# Patient Record
Sex: FEMALE | Race: WHITE | NOT HISPANIC OR LATINO | Employment: UNEMPLOYED | URBAN - METROPOLITAN AREA
[De-identification: names, ages, dates, MRNs, and addresses within clinical notes are randomized per-mention and may not be internally consistent; named-entity substitution may affect disease eponyms.]

---

## 2017-01-10 ENCOUNTER — RECORDS - HEALTHEAST (OUTPATIENT)
Dept: GENERAL RADIOLOGY | Facility: CLINIC | Age: 57
End: 2017-01-10

## 2017-01-10 ENCOUNTER — OFFICE VISIT - HEALTHEAST (OUTPATIENT)
Dept: FAMILY MEDICINE | Facility: CLINIC | Age: 57
End: 2017-01-10

## 2017-01-10 ENCOUNTER — COMMUNICATION - HEALTHEAST (OUTPATIENT)
Dept: TELEHEALTH | Facility: CLINIC | Age: 57
End: 2017-01-10

## 2017-01-10 DIAGNOSIS — R22.41 LOCALIZED SWELLING, MASS AND LUMP, RIGHT LOWER LIMB: ICD-10-CM

## 2017-01-10 DIAGNOSIS — F41.1 GENERALIZED ANXIETY DISORDER: ICD-10-CM

## 2017-01-10 DIAGNOSIS — Z51.81 MEDICATION MONITORING ENCOUNTER: ICD-10-CM

## 2017-01-10 DIAGNOSIS — Z23 NEED FOR INFLUENZA VACCINATION: ICD-10-CM

## 2017-01-10 DIAGNOSIS — Z11.59 NEED FOR HEPATITIS C SCREENING TEST: ICD-10-CM

## 2017-01-10 DIAGNOSIS — L98.9 FACIAL SKIN LESION: ICD-10-CM

## 2017-01-10 DIAGNOSIS — G43.909 MIGRAINE: ICD-10-CM

## 2017-01-10 DIAGNOSIS — D12.6 ADENOMATOUS COLON POLYP: ICD-10-CM

## 2017-01-10 DIAGNOSIS — R22.41 MASS OF HIP REGION, RIGHT: ICD-10-CM

## 2017-01-11 ENCOUNTER — COMMUNICATION - HEALTHEAST (OUTPATIENT)
Dept: FAMILY MEDICINE | Facility: CLINIC | Age: 57
End: 2017-01-11

## 2017-01-11 LAB — HCV AB SERPL QL IA: NEGATIVE

## 2017-02-23 ENCOUNTER — RECORDS - HEALTHEAST (OUTPATIENT)
Dept: ADMINISTRATIVE | Facility: OTHER | Age: 57
End: 2017-02-23

## 2017-02-24 ENCOUNTER — COMMUNICATION - HEALTHEAST (OUTPATIENT)
Dept: FAMILY MEDICINE | Facility: CLINIC | Age: 57
End: 2017-02-24

## 2017-05-06 ENCOUNTER — RECORDS - HEALTHEAST (OUTPATIENT)
Dept: ADMINISTRATIVE | Facility: OTHER | Age: 57
End: 2017-05-06

## 2017-05-06 ENCOUNTER — COMMUNICATION - HEALTHEAST (OUTPATIENT)
Dept: FAMILY MEDICINE | Facility: CLINIC | Age: 57
End: 2017-05-06

## 2017-05-09 ENCOUNTER — RECORDS - HEALTHEAST (OUTPATIENT)
Dept: ADMINISTRATIVE | Facility: OTHER | Age: 57
End: 2017-05-09

## 2017-05-15 ENCOUNTER — RECORDS - HEALTHEAST (OUTPATIENT)
Dept: ADMINISTRATIVE | Facility: OTHER | Age: 57
End: 2017-05-15

## 2017-05-16 ENCOUNTER — HOSPITAL ENCOUNTER (OUTPATIENT)
Dept: MAMMOGRAPHY | Facility: CLINIC | Age: 57
Discharge: HOME | End: 2017-05-16
Attending: FAMILY MEDICINE

## 2017-05-16 ENCOUNTER — COMMUNICATION - HEALTHEAST (OUTPATIENT)
Dept: TELEHEALTH | Facility: CLINIC | Age: 57
End: 2017-05-16

## 2017-05-16 DIAGNOSIS — Z12.31 VISIT FOR SCREENING MAMMOGRAM: ICD-10-CM

## 2017-07-10 ENCOUNTER — COMMUNICATION - HEALTHEAST (OUTPATIENT)
Dept: FAMILY MEDICINE | Facility: CLINIC | Age: 57
End: 2017-07-10

## 2017-07-20 ENCOUNTER — COMMUNICATION - HEALTHEAST (OUTPATIENT)
Dept: FAMILY MEDICINE | Facility: CLINIC | Age: 57
End: 2017-07-20

## 2017-08-22 ENCOUNTER — COMMUNICATION - HEALTHEAST (OUTPATIENT)
Dept: FAMILY MEDICINE | Facility: CLINIC | Age: 57
End: 2017-08-22

## 2018-01-20 ENCOUNTER — COMMUNICATION - HEALTHEAST (OUTPATIENT)
Dept: FAMILY MEDICINE | Facility: CLINIC | Age: 58
End: 2018-01-20

## 2018-01-20 DIAGNOSIS — F41.1 GENERALIZED ANXIETY DISORDER: ICD-10-CM

## 2018-02-15 ENCOUNTER — OFFICE VISIT - HEALTHEAST (OUTPATIENT)
Dept: FAMILY MEDICINE | Facility: CLINIC | Age: 58
End: 2018-02-15

## 2018-02-15 ENCOUNTER — COMMUNICATION - HEALTHEAST (OUTPATIENT)
Dept: TELEHEALTH | Facility: CLINIC | Age: 58
End: 2018-02-15

## 2018-02-15 DIAGNOSIS — Z12.4 SCREENING FOR MALIGNANT NEOPLASM OF CERVIX: ICD-10-CM

## 2018-02-15 DIAGNOSIS — Z51.81 MEDICATION MONITORING ENCOUNTER: ICD-10-CM

## 2018-02-15 DIAGNOSIS — E78.2 MIXED HYPERLIPIDEMIA: ICD-10-CM

## 2018-02-15 DIAGNOSIS — F41.1 ANXIETY STATE: ICD-10-CM

## 2018-02-15 DIAGNOSIS — Z79.899 CONTROLLED SUBSTANCE AGREEMENT SIGNED: ICD-10-CM

## 2018-02-15 DIAGNOSIS — R53.83 FATIGUE: ICD-10-CM

## 2018-02-15 DIAGNOSIS — F41.1 GENERALIZED ANXIETY DISORDER: ICD-10-CM

## 2018-02-15 DIAGNOSIS — Z00.00 ROUTINE ADULT HEALTH MAINTENANCE: ICD-10-CM

## 2018-02-15 LAB
ALBUMIN SERPL-MCNC: 4.4 G/DL (ref 3.5–5)
ALP SERPL-CCNC: 116 U/L (ref 45–120)
ALT SERPL W P-5'-P-CCNC: 18 U/L (ref 0–45)
ANION GAP SERPL CALCULATED.3IONS-SCNC: 11 MMOL/L (ref 5–18)
AST SERPL W P-5'-P-CCNC: 18 U/L (ref 0–40)
BILIRUB SERPL-MCNC: 0.7 MG/DL (ref 0–1)
BUN SERPL-MCNC: 14 MG/DL (ref 8–22)
CALCIUM SERPL-MCNC: 9.8 MG/DL (ref 8.5–10.5)
CHLORIDE BLD-SCNC: 105 MMOL/L (ref 98–107)
CHOLEST SERPL-MCNC: 327 MG/DL
CO2 SERPL-SCNC: 25 MMOL/L (ref 22–31)
CREAT SERPL-MCNC: 0.78 MG/DL (ref 0.6–1.1)
ERYTHROCYTE [DISTWIDTH] IN BLOOD BY AUTOMATED COUNT: 11.8 % (ref 11–14.5)
FASTING STATUS PATIENT QL REPORTED: YES
GFR SERPL CREATININE-BSD FRML MDRD: >60 ML/MIN/1.73M2
GLUCOSE BLD-MCNC: 93 MG/DL (ref 70–125)
HCT VFR BLD AUTO: 42.8 % (ref 35–47)
HDLC SERPL-MCNC: 53 MG/DL
HGB BLD-MCNC: 14.8 G/DL (ref 12–16)
LDLC SERPL CALC-MCNC: 248 MG/DL
MCH RBC QN AUTO: 31.1 PG (ref 27–34)
MCHC RBC AUTO-ENTMCNC: 34.5 G/DL (ref 32–36)
MCV RBC AUTO: 90 FL (ref 80–100)
PLATELET # BLD AUTO: 217 THOU/UL (ref 140–440)
PMV BLD AUTO: 7.3 FL (ref 7–10)
POTASSIUM BLD-SCNC: 4 MMOL/L (ref 3.5–5)
PROT SERPL-MCNC: 7.7 G/DL (ref 6–8)
RBC # BLD AUTO: 4.75 MILL/UL (ref 3.8–5.4)
SODIUM SERPL-SCNC: 141 MMOL/L (ref 136–145)
TRIGL SERPL-MCNC: 128 MG/DL
TSH SERPL DL<=0.005 MIU/L-ACNC: 1.46 UIU/ML (ref 0.3–5)
WBC: 5.1 THOU/UL (ref 4–11)

## 2018-02-15 ASSESSMENT — MIFFLIN-ST. JEOR: SCORE: 1249.91

## 2018-02-16 ENCOUNTER — COMMUNICATION - HEALTHEAST (OUTPATIENT)
Dept: FAMILY MEDICINE | Facility: CLINIC | Age: 58
End: 2018-02-16

## 2018-02-16 LAB
HPV SOURCE: NORMAL
HUMAN PAPILLOMA VIRUS 16 DNA: NEGATIVE
HUMAN PAPILLOMA VIRUS 18 DNA: NEGATIVE
HUMAN PAPILLOMA VIRUS FINAL DIAGNOSIS: NORMAL
HUMAN PAPILLOMA VIRUS OTHER HR: NEGATIVE
SPECIMEN DESCRIPTION: NORMAL

## 2018-02-20 ENCOUNTER — COMMUNICATION - HEALTHEAST (OUTPATIENT)
Dept: FAMILY MEDICINE | Facility: CLINIC | Age: 58
End: 2018-02-20

## 2018-02-22 LAB
BKR LAB AP ABNORMAL BLEEDING: NO
BKR LAB AP BIRTH CONTROL/HORMONES: ABNORMAL
BKR LAB AP CERVICAL APPEARANCE: NORMAL
BKR LAB AP GYN ADEQUACY: ABNORMAL
BKR LAB AP GYN INTERPRETATION: ABNORMAL
BKR LAB AP GYN OTHER FINDINGS: ABNORMAL
BKR LAB AP HPV REFLEX: ABNORMAL
BKR LAB AP LMP: 2013
BKR LAB AP PATIENT STATUS: ABNORMAL
BKR LAB AP PREVIOUS ABNORMAL: ABNORMAL
BKR LAB AP PREVIOUS NORMAL: 2012
HIGH RISK?: NO
PATH REPORT.COMMENTS IMP SPEC: ABNORMAL
RESULT FLAG (HE HISTORICAL CONVERSION): ABNORMAL

## 2018-03-09 ENCOUNTER — COMMUNICATION - HEALTHEAST (OUTPATIENT)
Dept: FAMILY MEDICINE | Facility: CLINIC | Age: 58
End: 2018-03-09

## 2018-03-09 DIAGNOSIS — E78.2 MIXED HYPERLIPIDEMIA: ICD-10-CM

## 2018-05-21 ENCOUNTER — HOSPITAL ENCOUNTER (OUTPATIENT)
Dept: NUTRITION | Facility: HOSPITAL | Age: 58
Discharge: HOME | End: 2018-05-21
Attending: FAMILY MEDICINE

## 2018-05-21 DIAGNOSIS — E78.2 MIXED HYPERLIPIDEMIA: ICD-10-CM

## 2018-08-22 ENCOUNTER — COMMUNICATION - HEALTHEAST (OUTPATIENT)
Dept: FAMILY MEDICINE | Facility: CLINIC | Age: 58
End: 2018-08-22

## 2018-09-10 ENCOUNTER — COMMUNICATION - HEALTHEAST (OUTPATIENT)
Dept: SCHEDULING | Facility: CLINIC | Age: 58
End: 2018-09-10

## 2018-09-14 ENCOUNTER — OFFICE VISIT - HEALTHEAST (OUTPATIENT)
Dept: FAMILY MEDICINE | Facility: CLINIC | Age: 58
End: 2018-09-14

## 2018-09-14 ENCOUNTER — COMMUNICATION - HEALTHEAST (OUTPATIENT)
Dept: TELEHEALTH | Facility: CLINIC | Age: 58
End: 2018-09-14

## 2018-09-14 DIAGNOSIS — B37.31 YEAST INFECTION OF THE VAGINA: ICD-10-CM

## 2018-09-14 DIAGNOSIS — J32.9 SINUS INFECTION: ICD-10-CM

## 2018-09-14 DIAGNOSIS — Z23 NEEDS FLU SHOT: ICD-10-CM

## 2018-09-20 ENCOUNTER — COMMUNICATION - HEALTHEAST (OUTPATIENT)
Dept: FAMILY MEDICINE | Facility: CLINIC | Age: 58
End: 2018-09-20

## 2018-10-04 ENCOUNTER — OFFICE VISIT - HEALTHEAST (OUTPATIENT)
Dept: FAMILY MEDICINE | Facility: CLINIC | Age: 58
End: 2018-10-04

## 2018-10-04 DIAGNOSIS — G43.909 MIGRAINE: ICD-10-CM

## 2018-10-04 DIAGNOSIS — C72.0: ICD-10-CM

## 2018-10-04 DIAGNOSIS — K58.2 IRRITABLE BOWEL SYNDROME WITH BOTH CONSTIPATION AND DIARRHEA: ICD-10-CM

## 2018-10-04 DIAGNOSIS — E78.2 MIXED HYPERLIPIDEMIA: ICD-10-CM

## 2018-10-04 DIAGNOSIS — F41.1 ANXIETY STATE: ICD-10-CM

## 2018-10-05 ENCOUNTER — HOSPITAL ENCOUNTER (OUTPATIENT)
Dept: MAMMOGRAPHY | Facility: CLINIC | Age: 58
Discharge: HOME | End: 2018-10-05
Attending: FAMILY MEDICINE

## 2018-10-05 DIAGNOSIS — Z12.31 VISIT FOR SCREENING MAMMOGRAM: ICD-10-CM

## 2018-10-05 LAB
ALBUMIN SERPL-MCNC: 4.5 G/DL (ref 3.5–5)
ALP SERPL-CCNC: 129 U/L (ref 45–120)
ALT SERPL W P-5'-P-CCNC: 21 U/L (ref 0–45)
ANION GAP SERPL CALCULATED.3IONS-SCNC: 12 MMOL/L (ref 5–18)
AST SERPL W P-5'-P-CCNC: 19 U/L (ref 0–40)
BILIRUB SERPL-MCNC: 0.9 MG/DL (ref 0–1)
BUN SERPL-MCNC: 10 MG/DL (ref 8–22)
CALCIUM SERPL-MCNC: 10 MG/DL (ref 8.5–10.5)
CHLORIDE BLD-SCNC: 108 MMOL/L (ref 98–107)
CHOLEST SERPL-MCNC: 192 MG/DL
CO2 SERPL-SCNC: 23 MMOL/L (ref 22–31)
CREAT SERPL-MCNC: 0.79 MG/DL (ref 0.6–1.1)
FASTING STATUS PATIENT QL REPORTED: YES
GFR SERPL CREATININE-BSD FRML MDRD: >60 ML/MIN/1.73M2
GLUCOSE BLD-MCNC: 93 MG/DL (ref 70–125)
HDLC SERPL-MCNC: 58 MG/DL
LDLC SERPL CALC-MCNC: 117 MG/DL
POTASSIUM BLD-SCNC: 3.8 MMOL/L (ref 3.5–5)
PROT SERPL-MCNC: 7.2 G/DL (ref 6–8)
SODIUM SERPL-SCNC: 143 MMOL/L (ref 136–145)
TRIGL SERPL-MCNC: 83 MG/DL

## 2018-10-09 ENCOUNTER — COMMUNICATION - HEALTHEAST (OUTPATIENT)
Dept: FAMILY MEDICINE | Facility: CLINIC | Age: 58
End: 2018-10-09

## 2018-10-22 ENCOUNTER — COMMUNICATION - HEALTHEAST (OUTPATIENT)
Dept: FAMILY MEDICINE | Facility: CLINIC | Age: 58
End: 2018-10-22

## 2018-10-22 DIAGNOSIS — E78.2 MIXED HYPERLIPIDEMIA: ICD-10-CM

## 2018-10-31 ENCOUNTER — RECORDS - HEALTHEAST (OUTPATIENT)
Dept: ADMINISTRATIVE | Facility: OTHER | Age: 58
End: 2018-10-31

## 2019-02-22 ENCOUNTER — COMMUNICATION - HEALTHEAST (OUTPATIENT)
Dept: FAMILY MEDICINE | Facility: CLINIC | Age: 59
End: 2019-02-22

## 2019-02-22 DIAGNOSIS — G43.909 MIGRAINE: ICD-10-CM

## 2019-04-25 ENCOUNTER — COMMUNICATION - HEALTHEAST (OUTPATIENT)
Dept: FAMILY MEDICINE | Facility: CLINIC | Age: 59
End: 2019-04-25

## 2019-04-25 DIAGNOSIS — F41.1 GENERALIZED ANXIETY DISORDER: ICD-10-CM

## 2019-04-26 ENCOUNTER — COMMUNICATION - HEALTHEAST (OUTPATIENT)
Dept: FAMILY MEDICINE | Facility: CLINIC | Age: 59
End: 2019-04-26

## 2019-04-26 DIAGNOSIS — F41.1 ANXIETY STATE: ICD-10-CM

## 2019-05-09 ENCOUNTER — OFFICE VISIT - HEALTHEAST (OUTPATIENT)
Dept: FAMILY MEDICINE | Facility: CLINIC | Age: 59
End: 2019-05-09

## 2019-05-09 DIAGNOSIS — G43.009 MIGRAINE WITHOUT AURA AND WITHOUT STATUS MIGRAINOSUS, NOT INTRACTABLE: ICD-10-CM

## 2019-05-09 DIAGNOSIS — C72.0: ICD-10-CM

## 2019-05-09 DIAGNOSIS — M54.9 CHRONIC BILATERAL BACK PAIN, UNSPECIFIED BACK LOCATION: ICD-10-CM

## 2019-05-09 DIAGNOSIS — F41.1 ANXIETY STATE: ICD-10-CM

## 2019-05-09 DIAGNOSIS — G89.29 CHRONIC BILATERAL BACK PAIN, UNSPECIFIED BACK LOCATION: ICD-10-CM

## 2019-05-29 ENCOUNTER — COMMUNICATION - HEALTHEAST (OUTPATIENT)
Dept: FAMILY MEDICINE | Facility: CLINIC | Age: 59
End: 2019-05-29

## 2019-05-31 ENCOUNTER — OFFICE VISIT - HEALTHEAST (OUTPATIENT)
Dept: FAMILY MEDICINE | Facility: CLINIC | Age: 59
End: 2019-05-31

## 2019-05-31 DIAGNOSIS — F33.2 SEVERE EPISODE OF RECURRENT MAJOR DEPRESSIVE DISORDER, WITHOUT PSYCHOTIC FEATURES (H): ICD-10-CM

## 2019-05-31 DIAGNOSIS — C72.0: ICD-10-CM

## 2019-05-31 DIAGNOSIS — F41.1 ANXIETY STATE: ICD-10-CM

## 2019-05-31 DIAGNOSIS — G43.909 MIGRAINE WITHOUT STATUS MIGRAINOSUS, NOT INTRACTABLE, UNSPECIFIED MIGRAINE TYPE: ICD-10-CM

## 2019-06-17 ENCOUNTER — COMMUNICATION - HEALTHEAST (OUTPATIENT)
Dept: FAMILY MEDICINE | Facility: CLINIC | Age: 59
End: 2019-06-17

## 2019-06-17 DIAGNOSIS — F41.1 ANXIETY STATE: ICD-10-CM

## 2019-06-21 ENCOUNTER — COMMUNICATION - HEALTHEAST (OUTPATIENT)
Dept: FAMILY MEDICINE | Facility: CLINIC | Age: 59
End: 2019-06-21

## 2019-06-21 DIAGNOSIS — G43.009 MIGRAINE WITHOUT AURA AND WITHOUT STATUS MIGRAINOSUS, NOT INTRACTABLE: ICD-10-CM

## 2019-10-19 ENCOUNTER — COMMUNICATION - HEALTHEAST (OUTPATIENT)
Dept: FAMILY MEDICINE | Facility: CLINIC | Age: 59
End: 2019-10-19

## 2019-10-19 DIAGNOSIS — E78.2 MIXED HYPERLIPIDEMIA: ICD-10-CM

## 2019-10-19 DIAGNOSIS — F41.1 GENERALIZED ANXIETY DISORDER: ICD-10-CM

## 2019-10-24 ENCOUNTER — OFFICE VISIT - HEALTHEAST (OUTPATIENT)
Dept: FAMILY MEDICINE | Facility: CLINIC | Age: 59
End: 2019-10-24

## 2019-10-24 DIAGNOSIS — M25.551 HIP PAIN, RIGHT: ICD-10-CM

## 2019-10-24 DIAGNOSIS — M54.9 CHRONIC BILATERAL BACK PAIN, UNSPECIFIED BACK LOCATION: ICD-10-CM

## 2019-10-24 DIAGNOSIS — F41.1 ANXIETY STATE: ICD-10-CM

## 2019-10-24 DIAGNOSIS — E78.2 MIXED HYPERLIPIDEMIA: ICD-10-CM

## 2019-10-24 DIAGNOSIS — G89.29 CHRONIC BILATERAL BACK PAIN, UNSPECIFIED BACK LOCATION: ICD-10-CM

## 2019-10-24 LAB
ALBUMIN SERPL-MCNC: 4.6 G/DL (ref 3.5–5)
ALP SERPL-CCNC: 115 U/L (ref 45–120)
ALT SERPL W P-5'-P-CCNC: 15 U/L (ref 0–45)
ANION GAP SERPL CALCULATED.3IONS-SCNC: 9 MMOL/L (ref 5–18)
AST SERPL W P-5'-P-CCNC: 17 U/L (ref 0–40)
BILIRUB SERPL-MCNC: 0.6 MG/DL (ref 0–1)
BUN SERPL-MCNC: 10 MG/DL (ref 8–22)
CALCIUM SERPL-MCNC: 9.8 MG/DL (ref 8.5–10.5)
CHLORIDE BLD-SCNC: 106 MMOL/L (ref 98–107)
CHOLEST SERPL-MCNC: 214 MG/DL
CO2 SERPL-SCNC: 28 MMOL/L (ref 22–31)
CREAT SERPL-MCNC: 0.73 MG/DL (ref 0.6–1.1)
FASTING STATUS PATIENT QL REPORTED: YES
GFR SERPL CREATININE-BSD FRML MDRD: >60 ML/MIN/1.73M2
GLUCOSE BLD-MCNC: 84 MG/DL (ref 70–125)
HDLC SERPL-MCNC: 62 MG/DL
LDLC SERPL CALC-MCNC: 134 MG/DL
POTASSIUM BLD-SCNC: 4 MMOL/L (ref 3.5–5)
PROT SERPL-MCNC: 7.1 G/DL (ref 6–8)
SODIUM SERPL-SCNC: 143 MMOL/L (ref 136–145)
TRIGL SERPL-MCNC: 90 MG/DL

## 2019-10-24 ASSESSMENT — ANXIETY QUESTIONNAIRES
3. WORRYING TOO MUCH ABOUT DIFFERENT THINGS: SEVERAL DAYS
1. FEELING NERVOUS, ANXIOUS, OR ON EDGE: SEVERAL DAYS
GAD7 TOTAL SCORE: 5
6. BECOMING EASILY ANNOYED OR IRRITABLE: MORE THAN HALF THE DAYS
4. TROUBLE RELAXING: SEVERAL DAYS
5. BEING SO RESTLESS THAT IT IS HARD TO SIT STILL: NOT AT ALL
7. FEELING AFRAID AS IF SOMETHING AWFUL MIGHT HAPPEN: NOT AT ALL
IF YOU CHECKED OFF ANY PROBLEMS ON THIS QUESTIONNAIRE, HOW DIFFICULT HAVE THESE PROBLEMS MADE IT FOR YOU TO DO YOUR WORK, TAKE CARE OF THINGS AT HOME, OR GET ALONG WITH OTHER PEOPLE: SOMEWHAT DIFFICULT
2. NOT BEING ABLE TO STOP OR CONTROL WORRYING: NOT AT ALL

## 2019-10-24 ASSESSMENT — PATIENT HEALTH QUESTIONNAIRE - PHQ9: SUM OF ALL RESPONSES TO PHQ QUESTIONS 1-9: 11

## 2019-10-24 ASSESSMENT — MIFFLIN-ST. JEOR: SCORE: 1195.71

## 2019-10-29 ENCOUNTER — COMMUNICATION - HEALTHEAST (OUTPATIENT)
Dept: FAMILY MEDICINE | Facility: CLINIC | Age: 59
End: 2019-10-29

## 2019-10-31 ENCOUNTER — RECORDS - HEALTHEAST (OUTPATIENT)
Dept: ADMINISTRATIVE | Facility: OTHER | Age: 59
End: 2019-10-31

## 2019-11-18 ENCOUNTER — COMMUNICATION - HEALTHEAST (OUTPATIENT)
Dept: FAMILY MEDICINE | Facility: CLINIC | Age: 59
End: 2019-11-18

## 2019-11-18 DIAGNOSIS — F41.1 ANXIETY STATE: ICD-10-CM

## 2019-11-18 DIAGNOSIS — M54.9 CHRONIC BILATERAL BACK PAIN, UNSPECIFIED BACK LOCATION: ICD-10-CM

## 2019-11-18 DIAGNOSIS — E78.2 MIXED HYPERLIPIDEMIA: ICD-10-CM

## 2019-11-18 DIAGNOSIS — G89.29 CHRONIC BILATERAL BACK PAIN, UNSPECIFIED BACK LOCATION: ICD-10-CM

## 2019-11-19 ENCOUNTER — COMMUNICATION - HEALTHEAST (OUTPATIENT)
Dept: FAMILY MEDICINE | Facility: CLINIC | Age: 59
End: 2019-11-19

## 2019-11-19 DIAGNOSIS — G43.909 MIGRAINE: ICD-10-CM

## 2019-11-26 ENCOUNTER — RECORDS - HEALTHEAST (OUTPATIENT)
Dept: ADMINISTRATIVE | Facility: OTHER | Age: 59
End: 2019-11-26

## 2019-12-19 ENCOUNTER — OFFICE VISIT - HEALTHEAST (OUTPATIENT)
Dept: FAMILY MEDICINE | Facility: CLINIC | Age: 59
End: 2019-12-19

## 2019-12-19 DIAGNOSIS — M88.9 PAGET'S BONE DISEASE: ICD-10-CM

## 2019-12-19 DIAGNOSIS — G43.909 MIGRAINE WITHOUT STATUS MIGRAINOSUS, NOT INTRACTABLE, UNSPECIFIED MIGRAINE TYPE: ICD-10-CM

## 2019-12-19 DIAGNOSIS — F41.1 ANXIETY STATE: ICD-10-CM

## 2019-12-19 DIAGNOSIS — E78.2 MIXED HYPERLIPIDEMIA: ICD-10-CM

## 2019-12-19 ASSESSMENT — MIFFLIN-ST. JEOR: SCORE: 1177.28

## 2019-12-21 LAB
ALP BONE SERPL-MCNC: 27.8 UG/L
COLLAGEN CTX SERPL-MCNC: 793 PG/ML
COLLAGEN NTX/CREAT UR-SRTO: 131
CREAT UR-MCNC: 121 MG/DL

## 2019-12-22 ENCOUNTER — COMMUNICATION - HEALTHEAST (OUTPATIENT)
Dept: FAMILY MEDICINE | Facility: CLINIC | Age: 59
End: 2019-12-22

## 2020-02-19 ENCOUNTER — COMMUNICATION - HEALTHEAST (OUTPATIENT)
Dept: FAMILY MEDICINE | Facility: CLINIC | Age: 60
End: 2020-02-19

## 2020-02-19 DIAGNOSIS — F41.1 ANXIETY STATE: ICD-10-CM

## 2020-03-24 ENCOUNTER — OFFICE VISIT - HEALTHEAST (OUTPATIENT)
Dept: FAMILY MEDICINE | Facility: CLINIC | Age: 60
End: 2020-03-24

## 2020-03-24 ENCOUNTER — COMMUNICATION - HEALTHEAST (OUTPATIENT)
Dept: FAMILY MEDICINE | Facility: CLINIC | Age: 60
End: 2020-03-24

## 2020-03-24 DIAGNOSIS — J01.00 ACUTE NON-RECURRENT MAXILLARY SINUSITIS: ICD-10-CM

## 2020-03-24 DIAGNOSIS — F41.1 ANXIETY STATE: ICD-10-CM

## 2020-03-24 DIAGNOSIS — M88.9 PAGET DISEASE OF BONE: ICD-10-CM

## 2020-03-24 DIAGNOSIS — C72.0: ICD-10-CM

## 2020-05-15 ENCOUNTER — RECORDS - HEALTHEAST (OUTPATIENT)
Dept: ADMINISTRATIVE | Facility: OTHER | Age: 60
End: 2020-05-15

## 2020-05-15 ENCOUNTER — HOSPITAL ENCOUNTER (OUTPATIENT)
Dept: MAMMOGRAPHY | Facility: CLINIC | Age: 60
Discharge: HOME | End: 2020-05-15
Attending: FAMILY MEDICINE

## 2020-05-15 DIAGNOSIS — Z12.31 VISIT FOR SCREENING MAMMOGRAM: ICD-10-CM

## 2020-05-20 ENCOUNTER — COMMUNICATION - HEALTHEAST (OUTPATIENT)
Dept: FAMILY MEDICINE | Facility: CLINIC | Age: 60
End: 2020-05-20

## 2020-06-12 ENCOUNTER — COMMUNICATION - HEALTHEAST (OUTPATIENT)
Dept: FAMILY MEDICINE | Facility: CLINIC | Age: 60
End: 2020-06-12

## 2020-06-12 DIAGNOSIS — F41.1 GENERALIZED ANXIETY DISORDER: ICD-10-CM

## 2020-06-17 ENCOUNTER — COMMUNICATION - HEALTHEAST (OUTPATIENT)
Dept: FAMILY MEDICINE | Facility: CLINIC | Age: 60
End: 2020-06-17

## 2020-06-17 DIAGNOSIS — F41.1 ANXIETY STATE: ICD-10-CM

## 2020-06-23 ENCOUNTER — COMMUNICATION - HEALTHEAST (OUTPATIENT)
Dept: FAMILY MEDICINE | Facility: CLINIC | Age: 60
End: 2020-06-23

## 2020-06-23 DIAGNOSIS — F41.1 ANXIETY STATE: ICD-10-CM

## 2020-08-26 ENCOUNTER — COMMUNICATION - HEALTHEAST (OUTPATIENT)
Dept: FAMILY MEDICINE | Facility: CLINIC | Age: 60
End: 2020-08-26

## 2020-08-26 DIAGNOSIS — F41.1 ANXIETY STATE: ICD-10-CM

## 2020-08-28 ENCOUNTER — OFFICE VISIT - HEALTHEAST (OUTPATIENT)
Dept: FAMILY MEDICINE | Facility: CLINIC | Age: 60
End: 2020-08-28

## 2020-08-28 DIAGNOSIS — F41.1 ANXIETY STATE: ICD-10-CM

## 2020-08-28 DIAGNOSIS — E78.2 MIXED HYPERLIPIDEMIA: ICD-10-CM

## 2020-08-28 DIAGNOSIS — G43.909 MIGRAINE WITHOUT STATUS MIGRAINOSUS, NOT INTRACTABLE, UNSPECIFIED MIGRAINE TYPE: ICD-10-CM

## 2020-08-28 DIAGNOSIS — Z23 NEED FOR VACCINATION: ICD-10-CM

## 2020-08-28 LAB
ALBUMIN SERPL-MCNC: 4.4 G/DL (ref 3.5–5)
ALP SERPL-CCNC: 66 U/L (ref 45–120)
ALT SERPL W P-5'-P-CCNC: 13 U/L (ref 0–45)
ANION GAP SERPL CALCULATED.3IONS-SCNC: 9 MMOL/L (ref 5–18)
AST SERPL W P-5'-P-CCNC: 15 U/L (ref 0–40)
BILIRUB SERPL-MCNC: 0.5 MG/DL (ref 0–1)
BUN SERPL-MCNC: 12 MG/DL (ref 8–22)
CALCIUM SERPL-MCNC: 9.6 MG/DL (ref 8.5–10.5)
CHLORIDE BLD-SCNC: 105 MMOL/L (ref 98–107)
CHOLEST SERPL-MCNC: 205 MG/DL
CO2 SERPL-SCNC: 29 MMOL/L (ref 22–31)
CREAT SERPL-MCNC: 0.75 MG/DL (ref 0.6–1.1)
FASTING STATUS PATIENT QL REPORTED: YES
GFR SERPL CREATININE-BSD FRML MDRD: >60 ML/MIN/1.73M2
GLUCOSE BLD-MCNC: 89 MG/DL (ref 70–125)
HDLC SERPL-MCNC: 60 MG/DL
LDLC SERPL CALC-MCNC: 126 MG/DL
POTASSIUM BLD-SCNC: 4 MMOL/L (ref 3.5–5)
PROT SERPL-MCNC: 7.2 G/DL (ref 6–8)
SODIUM SERPL-SCNC: 143 MMOL/L (ref 136–145)
TRIGL SERPL-MCNC: 94 MG/DL

## 2020-08-28 ASSESSMENT — PATIENT HEALTH QUESTIONNAIRE - PHQ9: SUM OF ALL RESPONSES TO PHQ QUESTIONS 1-9: 10

## 2020-08-28 ASSESSMENT — ANXIETY QUESTIONNAIRES
4. TROUBLE RELAXING: SEVERAL DAYS
3. WORRYING TOO MUCH ABOUT DIFFERENT THINGS: SEVERAL DAYS
IF YOU CHECKED OFF ANY PROBLEMS ON THIS QUESTIONNAIRE, HOW DIFFICULT HAVE THESE PROBLEMS MADE IT FOR YOU TO DO YOUR WORK, TAKE CARE OF THINGS AT HOME, OR GET ALONG WITH OTHER PEOPLE: SOMEWHAT DIFFICULT
2. NOT BEING ABLE TO STOP OR CONTROL WORRYING: SEVERAL DAYS
7. FEELING AFRAID AS IF SOMETHING AWFUL MIGHT HAPPEN: NOT AT ALL
6. BECOMING EASILY ANNOYED OR IRRITABLE: MORE THAN HALF THE DAYS
1. FEELING NERVOUS, ANXIOUS, OR ON EDGE: SEVERAL DAYS
GAD7 TOTAL SCORE: 7
5. BEING SO RESTLESS THAT IT IS HARD TO SIT STILL: SEVERAL DAYS

## 2020-08-28 ASSESSMENT — MIFFLIN-ST. JEOR: SCORE: 1244.47

## 2020-08-31 ENCOUNTER — COMMUNICATION - HEALTHEAST (OUTPATIENT)
Dept: FAMILY MEDICINE | Facility: CLINIC | Age: 60
End: 2020-08-31

## 2020-11-25 ENCOUNTER — COMMUNICATION - HEALTHEAST (OUTPATIENT)
Dept: FAMILY MEDICINE | Facility: CLINIC | Age: 60
End: 2020-11-25

## 2020-12-16 ENCOUNTER — COMMUNICATION - HEALTHEAST (OUTPATIENT)
Dept: FAMILY MEDICINE | Facility: CLINIC | Age: 60
End: 2020-12-16

## 2020-12-16 DIAGNOSIS — F41.1 GENERALIZED ANXIETY DISORDER: ICD-10-CM

## 2021-02-10 ENCOUNTER — COMMUNICATION - HEALTHEAST (OUTPATIENT)
Dept: FAMILY MEDICINE | Facility: CLINIC | Age: 61
End: 2021-02-10

## 2021-02-10 DIAGNOSIS — F41.1 ANXIETY STATE: ICD-10-CM

## 2021-02-23 ENCOUNTER — OFFICE VISIT - HEALTHEAST (OUTPATIENT)
Dept: FAMILY MEDICINE | Facility: CLINIC | Age: 61
End: 2021-02-23

## 2021-02-23 DIAGNOSIS — Z51.81 MEDICATION MONITORING ENCOUNTER: ICD-10-CM

## 2021-02-23 DIAGNOSIS — Z00.00 ROUTINE ADULT HEALTH MAINTENANCE: ICD-10-CM

## 2021-02-23 DIAGNOSIS — E78.2 MIXED HYPERLIPIDEMIA: ICD-10-CM

## 2021-02-23 DIAGNOSIS — F41.1 GENERALIZED ANXIETY DISORDER: ICD-10-CM

## 2021-02-23 DIAGNOSIS — G43.909 MIGRAINE WITHOUT STATUS MIGRAINOSUS, NOT INTRACTABLE, UNSPECIFIED MIGRAINE TYPE: ICD-10-CM

## 2021-02-23 DIAGNOSIS — Z12.4 SCREENING FOR MALIGNANT NEOPLASM OF CERVIX: ICD-10-CM

## 2021-02-23 DIAGNOSIS — F32.1 MODERATE MAJOR DEPRESSION (H): ICD-10-CM

## 2021-02-23 LAB
ALBUMIN SERPL-MCNC: 4.6 G/DL (ref 3.5–5)
ALP SERPL-CCNC: 66 U/L (ref 45–120)
ALT SERPL W P-5'-P-CCNC: 11 U/L (ref 0–45)
ANION GAP SERPL CALCULATED.3IONS-SCNC: 7 MMOL/L (ref 5–18)
AST SERPL W P-5'-P-CCNC: 15 U/L (ref 0–40)
BILIRUB SERPL-MCNC: 0.8 MG/DL (ref 0–1)
BUN SERPL-MCNC: 12 MG/DL (ref 8–22)
CALCIUM SERPL-MCNC: 9.2 MG/DL (ref 8.5–10.5)
CHLORIDE BLD-SCNC: 104 MMOL/L (ref 98–107)
CHOLEST SERPL-MCNC: 256 MG/DL
CO2 SERPL-SCNC: 30 MMOL/L (ref 22–31)
CREAT SERPL-MCNC: 0.76 MG/DL (ref 0.6–1.1)
ERYTHROCYTE [DISTWIDTH] IN BLOOD BY AUTOMATED COUNT: 12 % (ref 11–14.5)
FASTING STATUS PATIENT QL REPORTED: YES
GFR SERPL CREATININE-BSD FRML MDRD: >60 ML/MIN/1.73M2
GLUCOSE BLD-MCNC: 86 MG/DL (ref 70–125)
HCT VFR BLD AUTO: 39.9 % (ref 35–47)
HDLC SERPL-MCNC: 58 MG/DL
HGB BLD-MCNC: 13.7 G/DL (ref 12–16)
LDLC SERPL CALC-MCNC: 177 MG/DL
MCH RBC QN AUTO: 30.2 PG (ref 27–34)
MCHC RBC AUTO-ENTMCNC: 34.3 G/DL (ref 32–36)
MCV RBC AUTO: 88 FL (ref 80–100)
PLATELET # BLD AUTO: 230 THOU/UL (ref 140–440)
PMV BLD AUTO: 9 FL (ref 7–10)
POTASSIUM BLD-SCNC: 3.8 MMOL/L (ref 3.5–5)
PROT SERPL-MCNC: 7.2 G/DL (ref 6–8)
RBC # BLD AUTO: 4.53 MILL/UL (ref 3.8–5.4)
SODIUM SERPL-SCNC: 141 MMOL/L (ref 136–145)
TRIGL SERPL-MCNC: 107 MG/DL
WBC: 4.5 THOU/UL (ref 4–11)

## 2021-02-23 RX ORDER — SERTRALINE HYDROCHLORIDE 100 MG/1
300 TABLET, FILM COATED ORAL DAILY
Qty: 270 TABLET | Refills: 1 | Status: SHIPPED | OUTPATIENT
Start: 2021-02-23 | End: 2021-09-30

## 2021-02-23 RX ORDER — TRIMETHOBENZAMIDE HYDROCHLORIDE 300 MG/1
300 CAPSULE ORAL
Status: SHIPPED | COMMUNITY
Start: 2021-02-23 | End: 2021-09-30

## 2021-02-23 ASSESSMENT — ANXIETY QUESTIONNAIRES
3. WORRYING TOO MUCH ABOUT DIFFERENT THINGS: MORE THAN HALF THE DAYS
GAD7 TOTAL SCORE: 7
IF YOU CHECKED OFF ANY PROBLEMS ON THIS QUESTIONNAIRE, HOW DIFFICULT HAVE THESE PROBLEMS MADE IT FOR YOU TO DO YOUR WORK, TAKE CARE OF THINGS AT HOME, OR GET ALONG WITH OTHER PEOPLE: SOMEWHAT DIFFICULT
6. BECOMING EASILY ANNOYED OR IRRITABLE: MORE THAN HALF THE DAYS
5. BEING SO RESTLESS THAT IT IS HARD TO SIT STILL: NOT AT ALL
4. TROUBLE RELAXING: SEVERAL DAYS
1. FEELING NERVOUS, ANXIOUS, OR ON EDGE: SEVERAL DAYS
2. NOT BEING ABLE TO STOP OR CONTROL WORRYING: SEVERAL DAYS
7. FEELING AFRAID AS IF SOMETHING AWFUL MIGHT HAPPEN: NOT AT ALL

## 2021-02-23 ASSESSMENT — MIFFLIN-ST. JEOR: SCORE: 1263.75

## 2021-02-23 ASSESSMENT — PATIENT HEALTH QUESTIONNAIRE - PHQ9: SUM OF ALL RESPONSES TO PHQ QUESTIONS 1-9: 9

## 2021-02-24 ENCOUNTER — RECORDS - HEALTHEAST (OUTPATIENT)
Dept: ADMINISTRATIVE | Facility: OTHER | Age: 61
End: 2021-02-24

## 2021-03-01 LAB
BKR LAB AP ABNORMAL BLEEDING: NO
BKR LAB AP BIRTH CONTROL/HORMONES: NORMAL
BKR LAB AP CERVICAL APPEARANCE: NORMAL
BKR LAB AP GYN ADEQUACY: NORMAL
BKR LAB AP GYN INTERPRETATION: NORMAL
BKR LAB AP HPV REFLEX: NORMAL
BKR LAB AP LMP: NORMAL
BKR LAB AP PATIENT STATUS: NORMAL
BKR LAB AP PREVIOUS ABNORMAL: NORMAL
BKR LAB AP PREVIOUS NORMAL: 2012
HIGH RISK?: NO
PATH REPORT.COMMENTS IMP SPEC: NORMAL
RESULT FLAG (HE HISTORICAL CONVERSION): NORMAL

## 2021-03-02 ENCOUNTER — COMMUNICATION - HEALTHEAST (OUTPATIENT)
Dept: FAMILY MEDICINE | Facility: CLINIC | Age: 61
End: 2021-03-02

## 2021-03-03 ENCOUNTER — COMMUNICATION - HEALTHEAST (OUTPATIENT)
Dept: FAMILY MEDICINE | Facility: CLINIC | Age: 61
End: 2021-03-03

## 2021-03-09 ENCOUNTER — COMMUNICATION - HEALTHEAST (OUTPATIENT)
Dept: FAMILY MEDICINE | Facility: CLINIC | Age: 61
End: 2021-03-09

## 2021-03-09 DIAGNOSIS — G43.909 MIGRAINE: ICD-10-CM

## 2021-03-10 RX ORDER — SUMATRIPTAN 50 MG/1
50 TABLET, FILM COATED ORAL
Qty: 30 TABLET | Refills: 3 | Status: SHIPPED | OUTPATIENT
Start: 2021-03-10 | End: 2022-03-10

## 2021-03-22 ENCOUNTER — COMMUNICATION - HEALTHEAST (OUTPATIENT)
Dept: FAMILY MEDICINE | Facility: CLINIC | Age: 61
End: 2021-03-22

## 2021-03-22 DIAGNOSIS — F41.1 GENERALIZED ANXIETY DISORDER: ICD-10-CM

## 2021-03-30 ENCOUNTER — COMMUNICATION - HEALTHEAST (OUTPATIENT)
Dept: FAMILY MEDICINE | Facility: CLINIC | Age: 61
End: 2021-03-30

## 2021-03-30 DIAGNOSIS — F41.1 GENERALIZED ANXIETY DISORDER: ICD-10-CM

## 2021-03-30 RX ORDER — ALPRAZOLAM 0.5 MG
0.5-1 TABLET ORAL 3 TIMES DAILY PRN
Qty: 120 TABLET | Refills: 5 | Status: SHIPPED | OUTPATIENT
Start: 2021-03-30 | End: 2021-09-13

## 2021-04-02 ENCOUNTER — COMMUNICATION - HEALTHEAST (OUTPATIENT)
Dept: FAMILY MEDICINE | Facility: CLINIC | Age: 61
End: 2021-04-02

## 2021-04-02 DIAGNOSIS — E78.2 MIXED HYPERLIPIDEMIA: ICD-10-CM

## 2021-04-27 ENCOUNTER — COMMUNICATION - HEALTHEAST (OUTPATIENT)
Dept: FAMILY MEDICINE | Facility: CLINIC | Age: 61
End: 2021-04-27

## 2021-04-27 DIAGNOSIS — N30.00 ACUTE CYSTITIS WITHOUT HEMATURIA: ICD-10-CM

## 2021-04-27 DIAGNOSIS — E78.2 MIXED HYPERLIPIDEMIA: ICD-10-CM

## 2021-04-27 RX ORDER — CIPROFLOXACIN 500 MG/1
TABLET, FILM COATED ORAL
Qty: 10 TABLET | Refills: 0 | Status: SHIPPED | OUTPATIENT
Start: 2021-04-27 | End: 2021-09-30

## 2021-04-27 RX ORDER — ATORVASTATIN CALCIUM 20 MG/1
20 TABLET, FILM COATED ORAL AT BEDTIME
Qty: 90 TABLET | Refills: 1 | Status: SHIPPED | OUTPATIENT
Start: 2021-04-27 | End: 2022-06-06

## 2021-04-27 RX ORDER — PHENAZOPYRIDINE HYDROCHLORIDE 200 MG/1
200 TABLET, FILM COATED ORAL 3 TIMES DAILY PRN
Qty: 10 TABLET | Refills: 2 | Status: SHIPPED | OUTPATIENT
Start: 2021-04-27 | End: 2021-09-30

## 2021-05-03 ENCOUNTER — RECORDS - HEALTHEAST (OUTPATIENT)
Dept: FAMILY MEDICINE | Facility: CLINIC | Age: 61
End: 2021-05-03

## 2021-05-26 ENCOUNTER — RECORDS - HEALTHEAST (OUTPATIENT)
Dept: ADMINISTRATIVE | Facility: CLINIC | Age: 61
End: 2021-05-26

## 2021-05-26 ASSESSMENT — PATIENT HEALTH QUESTIONNAIRE - PHQ9
SUM OF ALL RESPONSES TO PHQ QUESTIONS 1-9: 10
SUM OF ALL RESPONSES TO PHQ QUESTIONS 1-9: 11

## 2021-05-27 ENCOUNTER — RECORDS - HEALTHEAST (OUTPATIENT)
Dept: ADMINISTRATIVE | Facility: CLINIC | Age: 61
End: 2021-05-27

## 2021-05-27 ASSESSMENT — PATIENT HEALTH QUESTIONNAIRE - PHQ9: SUM OF ALL RESPONSES TO PHQ QUESTIONS 1-9: 9

## 2021-05-28 ENCOUNTER — RECORDS - HEALTHEAST (OUTPATIENT)
Dept: ADMINISTRATIVE | Facility: CLINIC | Age: 61
End: 2021-05-28

## 2021-05-28 ASSESSMENT — ANXIETY QUESTIONNAIRES
GAD7 TOTAL SCORE: 7
GAD7 TOTAL SCORE: 5
GAD7 TOTAL SCORE: 7

## 2021-05-28 NOTE — TELEPHONE ENCOUNTER
Refill Approved    Rx renewed per Medication Renewal Policy. Medication was last renewed on 2/15/18.    Gini Guevara, [facility] Triage/Med Refill 4/26/2019     Requested Prescriptions   Pending Prescriptions Disp Refills     sertraline (ZOLOFT) 100 MG tablet [Pharmacy Med Name: SERTRALINE 100MG TABLETS] 270 tablet 0     Sig: TAKE 3 TABLETS BY MOUTH DAILY.       SSRI Refill Protocol  Passed - 4/26/2019 12:26 PM        Passed - PCP or prescribing provider visit in last year     Last office visit with prescriber/PCP: 10/4/2018 Deirdre Flores MD OR same dept: 10/4/2018 Deirdre Flores MD OR same specialty: 10/4/2018 Deirdre Flores MD  Last physical: 2/15/2018 Last MTM visit: Visit date not found   Next visit within 3 mo: Visit date not found  Next physical within 3 mo: Visit date not found  Prescriber OR PCP: Deirdre Flores MD  Last diagnosis associated with med order: 1. Generalized anxiety disorder  - sertraline (ZOLOFT) 100 MG tablet [Pharmacy Med Name: SERTRALINE 100MG TABLETS]; TAKE 3 TABLETS BY MOUTH DAILY.  Dispense: 270 tablet; Refill: 0    If protocol passes may refill for 12 months if within 3 months of last provider visit (or a total of 15 months).           Refused Prescriptions Disp Refills     ALPRAZolam (XANAX) 1 MG tablet [Pharmacy Med Name: ALPRAZOLAM 1MG TABLETS] 90 tablet 0     Sig: TAKE 1/2 TO 1 TABLET(0.5 TO 1 MG) BY MOUTH THREE TIMES DAILY AS NEEDED FOR ANXIETY       Controlled Substances Refill Protocol Failed - 4/26/2019 12:26 PM        Failed - Route all Controlled Substance Requests to Provider        Failed - Patient has controlled substance agreement in past 12 months     Encounter-Level CSA Scan Date - 02/15/2018:    Scan on 2/19/2018  1:30 PM: ALPRAZOLAM (below)                 Passed - Visit with PCP or prescribing provider visit in past 12 months      Last office visit with prescriber/PCP: 10/4/2018 Deirdre Flores MD OR same dept:  10/4/2018 Deirdre Flores MD OR same specialty: 10/4/2018 Deirdre Flores MD Last physical: 2/15/2018 Last MTM visit: Visit date not found    Next visit within 3 mo: Visit date not found  Next physical within 3 mo: Visit date not found  Prescriber OR PCP: Deirdre Flores MD  Last diagnosis associated with med order: 1. Generalized anxiety disorder  - sertraline (ZOLOFT) 100 MG tablet [Pharmacy Med Name: SERTRALINE 100MG TABLETS]; TAKE 3 TABLETS BY MOUTH DAILY.  Dispense: 270 tablet; Refill: 0

## 2021-05-28 NOTE — PROGRESS NOTES
Assessment:         1. Anxiety Disorder NOS  ALPRAZolam (XANAX) 0.5 MG tablet   2. Migraine without aura and without status migrainosus, not intractable  topiramate (TOPAMAX) 25 MG tablet   3. Chronic bilateral back pain, unspecified back location     4. Ependymoma Of The Spinal Cord              Plan:          Fasting labs were reviewed. Blood pressure is under adequate control. We reviewed her current medications and she will continue the same at this time. We reviewed dietary recommendations, including low salt and high fiber diet, and we will check fasting labs at her next follow up. We reviewed recommendations for regular exercise/activity. AS far as her anxiety, she will continue with the alprazolam, and I will send 0.5 mg tabs to make tapering easier. We discussed the potential for abuse or harm from misuse for the alprazolam, and we had her sign a treatment agreement today. For her back pain and history of ependymoma, I will complete a disabled parking permit for her. She will plan to follow up in 4-6 mos for repeat fasting labs and med check, sooner if any difficulties.         Subjective:        Fasting today? No  Hyperlipidemia      Patient is here for follow-up of elevated cholesterol. Associated signs and symptoms: none. Denies chest pain, dyspnea, orthopnea, palpitations, peripheral edema and tiredness/fatigue. The patient exercises intermittently. Weight trend: fluctuating a bit.      She is currently taking lipitor 10 mg. Current side effects include: none      Anxiety       Yaritza Haskins is a 59 y.o. female who presents for follow up of depression and anxiety. She has the following anxiety symptoms: feelings of losing control and racing thoughts. Onset of symptoms was several years ago. Symptoms have been controlled since that time with alprazolam 1/2 tab daily and 2 tabs at HS. She would like a new Rx for 0.5 mg tabs. Her symptoms are better since she retired and she watches her grandson  during the day. Current symptoms include insomnia and fatigue. Patient denies anhedonia, depressed mood, hopelessness and recurrent thoughts of death. She denies current suicidal and homicidal ideation.        She has chronic low back pain due to her history of ependymoma and she uses a brace when active. She wonders about getting a handicapped parking tag.      The following portions of the patient's history were reviewed and updated as appropriate: allergies, current medications, past family history, past medical history, past social history, past surgical history and problem list.    Review of Systems  A 12 point comprehensive review of systems was negative except as noted.        Objective:        Vitals:    05/09/19 1334   BP: 126/88   Patient Position: Sitting   Cuff Size: Adult Large   Pulse: 71   SpO2: 98%   Weight: 152 lb 7 oz (69.1 kg)          General:    Alert, cooperative, no distress   Head:    Normocephalic, without obvious abnormality, atraumatic   Eyes:    PERRL, conjunctiva/corneas clear, EOM's intact    Ears:    Normal TM's and external ear canals, both ears   Nose:   Nares normal, mucosa normal, no drainage or sinus tenderness   Throat:   Lips, mucosa, and tongue normal; teeth and gums normal   Neck:   Supple, symmetrical,  no adenopathy;  thyroid:  normal   Back:     Symmetric, ROM normal, no CVA tenderness   Lungs:     Clear to auscultation bilaterally, respirations unlabored   CV:    Regular rate and rhythm   Abdomen:     Soft, non-tender, no masses, no organomegaly   Extremities:   Extremities normal, atraumatic, no cyanosis or edema   Pulses:   2+ and symmetric all extremities   Skin:   Skin color, texture, turgor normal, no rashes or lesions   Neurologic:   normal strength and tone throughout

## 2021-05-28 NOTE — TELEPHONE ENCOUNTER
Patient is asking if this can be filled today.    Refill Request  Did you contact pharmacy: Yes  Medication name:   Requested Prescriptions     Pending Prescriptions Disp Refills     sertraline (ZOLOFT) 100 MG tablet [Pharmacy Med Name: SERTRALINE 100MG TABLETS] 270 tablet 0     Sig: TAKE 3 TABLETS BY MOUTH DAILY.     ALPRAZolam (XANAX) 1 MG tablet [Pharmacy Med Name: ALPRAZOLAM 1MG TABLETS] 90 tablet 0     Sig: TAKE 1/2 TO 1 TABLET(0.5 TO 1 MG) BY MOUTH THREE TIMES DAILY AS NEEDED FOR ANXIETY     Who prescribed the medication: PCP  Pharmacy Name and Location: The request is just for sertraline.  The Xanax is in a seperate encounter.  Please review.   Is patient out of medication: Yes  Patient notified refills processed in 72 hours:  yes  Okay to leave a detailed message: no

## 2021-05-28 NOTE — TELEPHONE ENCOUNTER
Patient is asking for this to be filled today.    Controlled Substance Refill Request  Medication Name:   Requested Prescriptions     Pending Prescriptions Disp Refills     ALPRAZolam (XANAX) 1 MG tablet 90 tablet 5     Sig: Take 0.5-1 tablets (0.5-1 mg total) by mouth 3 (three) times a day as needed for anxiety.     Date Last Fill: 10/24/18  Pharmacy: [facility]      Submit electronically to pharmacy  Controlled Substance Agreement Date Scanned:   Encounter-Level CSA Scan Date - 02/15/2018:    Scan on 2/19/2018  1:30 PM: ALPRAZOLAM (below)         Last office visit with prescriber/PCP: 10/4/2018 Deirdre Flores MD OR same dept: 10/4/2018 Deirdre Flores MD OR same specialty: 10/4/2018 Deirdre Flores MD  Last physical: 2/15/2018 Last MTM visit: Visit date not found

## 2021-05-29 ENCOUNTER — RECORDS - HEALTHEAST (OUTPATIENT)
Dept: ADMINISTRATIVE | Facility: CLINIC | Age: 61
End: 2021-05-29

## 2021-05-29 NOTE — TELEPHONE ENCOUNTER
Medication Question or Clarification  Who is calling: Patient  What medication are you calling about? (include dose and sig)   ALPRAZolam (XANAX) 0.5 MG tablet 120 tablet 5 5/9/2019     Sig - Route: Take 1-2 tablets (0.5-1 mg total) by mouth 3 (three) times a day as needed for anxiety. - Oral        Who prescribed the medication?: Dr Flores  What is your question/concern?: Patient states this Rx only gives her a 20 day supply. States needs a 30 day supply sent to pharmacy.  [facility].  Okay to leave a detailed message?: Yes  Site CMT - Please call the pharmacy to obtain any additional needed information.      SARAH patient states thank you for your help with the appointment, she has one on 6/24/2019

## 2021-05-29 NOTE — TELEPHONE ENCOUNTER
Pt contacted. She would like referral for mental health. She states that she's having a hard time and struggling with her anxiety specifically. Overwhelming sadness and exhaustion.   Appt scheduled for tomorrow morning to discuss this as her feelings are worse since her last visit 19.    In addition to this- patient is wanting to enroll in a study for this TMS at [facility].   She is requesting the followin:  1. Clinic Symmary letter discussing diagnosis and concerns  2. List of meds with dosages  3. History of previous meds and doses with reason why they were discontinued.  4. Any hospitalizations within the last 4 years  5. Last 3 months of lab results  6. Any additional information that Dr Flores feels would be beneficial to contribute    Fax to [phone]  Fax Cover Sheet with:  Full name and .   [place] #[phone]  Attn: Rodney [email or web address].[email or web address], [facility]

## 2021-05-29 NOTE — TELEPHONE ENCOUNTER
Who is calling:  Patient     Reason for Call:  Would like to talk with Deirdre Flores MD regarding TMS maqnetic procedure for depression that she saw on TV. She states that she would like to know if the provider thinks that this is something that would be beneficial for the patient for her depression?    Date of last appointment with primary care: 5/9/2019    Okay to leave a detailed message: Yes

## 2021-05-29 NOTE — TELEPHONE ENCOUNTER
I'm not familiar with the criteria for the procedure. It would probably be best for her to see a mental health provider to discuss it. Would she like a referral?
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
## 2021-05-29 NOTE — TELEPHONE ENCOUNTER
Medication Question or Clarification  Who is calling: [id number]  What medication are you calling about? (include dose and sig) Topiramate 25 mg, one tablet two times a day  Who prescribed the medication?: Deirdre Flores MD   What is your question/concern?:  Patient only has 174 left on prescription from May, would like 180 for our save a trip service.    [facility]: [id number]  Okay to leave a detailed message?: Yes  Site CMT - Please call the pharmacy to obtain any additional needed information.

## 2021-05-30 ENCOUNTER — RECORDS - HEALTHEAST (OUTPATIENT)
Dept: ADMINISTRATIVE | Facility: CLINIC | Age: 61
End: 2021-05-30

## 2021-05-30 VITALS — BODY MASS INDEX: 25.96 KG/M2 | WEIGHT: 156 LBS

## 2021-05-31 VITALS — WEIGHT: 153.2 LBS | BODY MASS INDEX: 26.15 KG/M2 | HEIGHT: 64 IN

## 2021-06-01 ENCOUNTER — RECORDS - HEALTHEAST (OUTPATIENT)
Dept: ADMINISTRATIVE | Facility: CLINIC | Age: 61
End: 2021-06-01

## 2021-06-01 VITALS — BODY MASS INDEX: 26.3 KG/M2 | WEIGHT: 153.2 LBS

## 2021-06-01 VITALS — WEIGHT: 149 LBS | BODY MASS INDEX: 25.58 KG/M2

## 2021-06-02 ENCOUNTER — RECORDS - HEALTHEAST (OUTPATIENT)
Dept: ADMINISTRATIVE | Facility: CLINIC | Age: 61
End: 2021-06-02

## 2021-06-02 VITALS — BODY MASS INDEX: 24.39 KG/M2 | WEIGHT: 142.1 LBS

## 2021-06-02 VITALS — WEIGHT: 152.44 LBS | BODY MASS INDEX: 26.17 KG/M2

## 2021-06-02 NOTE — PROGRESS NOTES
Assessment:         1. Mixed hyperlipidemia  Lipid [place] FASTING    Comprehensive Metabolic Panel   2. Chronic bilateral back pain, unspecified back location  gabapentin (NEURONTIN) 100 MG capsule   3. Hip pain, right  Ambulatory referral to Orthopedics   4. Anxiety Disorder NOS              Plan:          Fasting labs were drawn. Blood pressure is under adequate control. We reviewed her current medications and she will continue the same pending additional lab results. We reviewed dietary recommendations, including low salt and high fiber diet, and recommendations for regular exercise/activity. She will continue her same dosing of sertraline and alprazolam per her psychiatrist. I will send her to ortho for further evaluation of the hip pain and she will continue following with Neurology at [facility] for the back symptoms. I will send a Rx for low dose gabapentin with instructions on a slow titration. She will call if she would like to continue increasing further. She will plan to follow up in 4-6 mos for repeat fasting labs and med check, sooner if any difficulties.         Subjective:        Fasting today? Yes  Hyperlipidemia      Yaritza Haskins is a 59 y.o. female here for follow-up of elevated cholesterol. A repeat fasting lipid profile was done. Compliance with treatment has been good. Patient denies muscle pain associated with her medications. Associated signs and symptoms: none. Denies chest pain, dyspnea, palpitations, peripheral edema and tiredness/fatigue. The patient exercises infrequently. Weight trend: fluctuating a bit.        She is currently taking atorvastatin 10 mg daily. Current side effects include: none.          She got TMS at [facility] this summer for her anxiety and chronic depression, and did very well with that. She continues on sertraline 200 mg daily and Alprazolam 0.5mg  3x daily. She is working on tapering the sertraline at this time.         She is tapering her topamax which she has taken  "for migraines. She has chronic back and right hip pain following surgery for ependymoma of the spinal cord. She had surgery at [place] and follows up there regularly. She wonders about trying gabapentin for the back pain/ she is taking care of her grandchild, and is lifting frequently with that.      The following portions of the patient's history were reviewed and updated as appropriate: allergies, current medications, past family history, past medical history, past social history, past surgical history and problem list.    Review of Systems  A 12 point comprehensive review of systems was negative except as noted.          Objective:        Vitals:    10/24/19 1118   BP: 128/72   Patient Position: Sitting   Cuff Size: Adult Regular   Pulse: 62   SpO2: 98%   Weight: 141 lb 4 oz (64.1 kg)   Height: 5' 4\" (1.626 m)          General:    Alert, cooperative, no distress   Head:    Normocephalic, without obvious abnormality, atraumatic   Eyes:    PERRL, conjunctiva/corneas clear, EOM's intact    Ears:    Normal TM's and external ear canals, both ears   Nose:   Nares normal, mucosa normal, no drainage or sinus tenderness   Throat:   Lips, mucosa, and tongue normal; teeth and gums normal   Neck:   Supple, symmetrical,  no adenopathy;  thyroid:  normal   Back:     Symmetric, ROM normal, no CVA tenderness   Lungs:     Clear to auscultation bilaterally, respirations unlabored   CV:    Regular rate and rhythm   Abdomen:     Soft, non-tender, no masses, no organomegaly   Extremities:   Extremities normal, atraumatic, no cyanosis or edema   Pulses:   2+ and symmetric all extremities   Skin:   Skin color, texture, turgor normal, no rashes or lesions   Neurologic:   normal strength and tone throughout     Results for orders placed or performed in visit on 10/24/19   Lipid [place] FASTING   Result Value Ref Range    Cholesterol 214 (H) <=199 mg/dL    Triglycerides 90 <=149 mg/dL    HDL Cholesterol 62 >=50 mg/dL    LDL Calculated 134 " (H) <=129 mg/dL    Patient Fasting > 8hrs? Yes    Comprehensive Metabolic Panel   Result Value Ref Range    Sodium 143 136 - 145 mmol/L    Potassium 4.0 3.5 - 5.0 mmol/L    Chloride 106 98 - 107 mmol/L    CO2 28 22 - 31 mmol/L    Anion Gap, Calculation 9 5 - 18 mmol/L    Glucose 84 70 - 125 mg/dL    BUN 10 8 - 22 mg/dL    Creatinine 0.73 0.60 - 1.10 mg/dL    GFR MDRD Af Amer >60 >60 mL/min/1.73m2    GFR MDRD Non Af Amer >60 >60 mL/min/1.73m2    Bilirubin, Total 0.6 0.0 - 1.0 mg/dL    Calcium 9.8 8.5 - 10.5 mg/dL    Protein, Total 7.1 6.0 - 8.0 g/dL    Albumin 4.6 3.5 - 5.0 g/dL    Alkaline Phosphatase 115 45 - 120 U/L    AST 17 0 - 40 U/L    ALT 15 0 - 45 U/L

## 2021-06-02 NOTE — TELEPHONE ENCOUNTER
Refill Approved    Rx renewed per Medication Renewal Policy. Medication was last renewed on   Sertraline 4/26/19  Atorvastatin 10/22/18    Melody Delgado, [facility] Connection Triage/Med Refill 10/19/2019     Requested Prescriptions   Pending Prescriptions Disp Refills     sertraline (ZOLOFT) 100 MG tablet [Pharmacy Med Name: SERTRALINE 100MG TABLETS] 270 tablet 0     Sig: TAKE 3 TABLETS BY MOUTH DAILY.       SSRI Refill Protocol  Passed - 10/19/2019  5:08 AM        Passed - PCP or prescribing provider visit in last year     Last office visit with prescriber/PCP: 5/31/2019 Deirdre Flores MD OR same dept: 5/31/2019 Deirdre Flores MD OR same specialty: 5/31/2019 Deirdre Flores MD  Last physical: 2/15/2018 Last MTM visit: Visit date not found   Next visit within 3 mo: Visit date not found  Next physical within 3 mo: Visit date not found  Prescriber OR PCP: Deirdre Flores MD  Last diagnosis associated with med order: 1. Generalized anxiety disorder  - sertraline (ZOLOFT) 100 MG tablet [Pharmacy Med Name: SERTRALINE 100MG TABLETS]; TAKE 3 TABLETS BY MOUTH DAILY.  Dispense: 270 tablet; Refill: 0    2. Mixed hyperlipidemia  - atorvastatin (LIPITOR) 10 MG tablet [Pharmacy Med Name: ATORVASTATIN 10MG TABLETS]; TAKE 1 TABLET(10 MG) BY MOUTH AT BEDTIME  Dispense: 90 tablet; Refill: 0    If protocol passes may refill for 12 months if within 3 months of last provider visit (or a total of 15 months).             atorvastatin (LIPITOR) 10 MG tablet [Pharmacy Med Name: ATORVASTATIN 10MG TABLETS] 90 tablet 0     Sig: TAKE 1 TABLET(10 MG) BY MOUTH AT BEDTIME       Statins Refill Protocol (Hmg CoA Reductase Inhibitors) Passed - 10/19/2019  5:08 AM        Passed - PCP or prescribing provider visit in past 12 months      Last office visit with prescriber/PCP: 5/31/2019 Deirdre Flores MD OR same dept: 5/31/2019 Deirdre Flores MD OR same specialty: 5/31/2019 Deirdre Flores MD  Last  physical: 2/15/2018 Last MTM visit: Visit date not found   Next visit within 3 mo: Visit date not found  Next physical within 3 mo: Visit date not found  Prescriber OR PCP: Deirdre Flores MD  Last diagnosis associated with med order: 1. Generalized anxiety disorder  - sertraline (ZOLOFT) 100 MG tablet [Pharmacy Med Name: SERTRALINE 100MG TABLETS]; TAKE 3 TABLETS BY MOUTH DAILY.  Dispense: 270 tablet; Refill: 0    2. Mixed hyperlipidemia  - atorvastatin (LIPITOR) 10 MG tablet [Pharmacy Med Name: ATORVASTATIN 10MG TABLETS]; TAKE 1 TABLET(10 MG) BY MOUTH AT BEDTIME  Dispense: 90 tablet; Refill: 0    If protocol passes may refill for 12 months if within 3 months of last provider visit (or a total of 15 months).

## 2021-06-03 VITALS
HEIGHT: 64 IN | BODY MASS INDEX: 24.11 KG/M2 | SYSTOLIC BLOOD PRESSURE: 128 MMHG | WEIGHT: 141.25 LBS | DIASTOLIC BLOOD PRESSURE: 72 MMHG | HEART RATE: 62 BPM | OXYGEN SATURATION: 98 %

## 2021-06-03 VITALS
HEART RATE: 74 BPM | WEIGHT: 137.19 LBS | OXYGEN SATURATION: 98 % | BODY MASS INDEX: 23.42 KG/M2 | DIASTOLIC BLOOD PRESSURE: 84 MMHG | SYSTOLIC BLOOD PRESSURE: 138 MMHG | HEIGHT: 64 IN

## 2021-06-03 NOTE — TELEPHONE ENCOUNTER
Controlled Substance Refill Request  Medication Name:   Requested Prescriptions     Pending Prescriptions Disp Refills     ALPRAZolam (XANAX) 0.5 MG tablet 120 tablet 0     Sig: Take 1-2 tablets (0.5-1 mg total) by mouth 3 (three) times a day as needed for anxiety.     Date Last Fill: 06/18/19  Pharmacy: [id number] [address] [facility] [address] [facility] [address]      Submit electronically to pharmacy  Controlled Substance Agreement Date Scanned:   Encounter-Level CSA Scan Date - 02/15/2018:    Scan on 2/19/2018  1:30 PM: ALPRAZOLAM       Last office visit with prescriber/PCP: 10/24/2019 Deirdre Flores MD OR same dept: 10/24/2019 Deirdre Flores MD OR same specialty: 10/24/2019 Deirdre Flores MD  Last physical: 2/15/2018 Last MTM visit: Visit date not found

## 2021-06-03 NOTE — TELEPHONE ENCOUNTER
Who is calling:  Patient  Reason for Call:  Medication changes  Date of last appointment with primary care: 10/24/19  Okay to leave a detailed message: Yes    Patient is requesting to proceed with the increase of atorvastatin per letter 10/29/19.    Please advise with dose change and forward to pharmacy and place future  Lipid orders and when to recheck.    Please call an advise patient with the plan of care.

## 2021-06-04 VITALS
WEIGHT: 152 LBS | OXYGEN SATURATION: 98 % | BODY MASS INDEX: 25.95 KG/M2 | DIASTOLIC BLOOD PRESSURE: 78 MMHG | HEIGHT: 64 IN | SYSTOLIC BLOOD PRESSURE: 130 MMHG | HEART RATE: 61 BPM

## 2021-06-04 NOTE — PROGRESS NOTES
Assessment:         1. Paget's bone disease  Bone Alkaline Phosphatase    C-Telopeptide, Beta-Cross-Linked, Serum    N-Telopeptide, Cross-Linked, Urine   2. Anxiety Disorder NOS     3. Mixed hyperlipidemia     4. Migraine without status migrainosus, not intractable, unspecified migraine type              Plan:          Outside records were personally reviewed. Labs were drawn as noted and she will follow up with endocrinology as scheduled. Blood pressure is under adequate control. We reviewed her current medications and she will continue the same pending additional lab results. We reviewed dietary recommendations, including low salt and high fiber diet, vitamin D and calcium supplements, and recommendations for regular exercise/activity. She will continue on her same medications for the anxiety and mood symptoms, and will plan to follow up in 4-6 mos for repeat fasting labs and med check, sooner if any difficulties.         Subjective:        Fasting today? Yes  Hypertension & Hyperlipidemia      Yaritza Haskins is a 59 y.o. female here for follow-up of elevated blood pressure and hyperlipidemia who presents to discuss new diagnosis of Paget's disease of the bone. She saw [facility] for hip pain and her x-ray and MRI are consistent with Paget's. She has had pain in the bilateral hips, Right>Left radiating into the thighs. She reports pain in multiple bones and significant fatigue. She is scheduled in February with Endocrinology. She wonders if the pain she has attributed to her ependymoma for years is actually related to the Pagets. She has been on gabapentin for the back pain without significant change.      As far as her cholesterol, compliance with treatment has been good. Patient denies muscle pain associated with her medications. Associated signs and symptoms: tiredness/fatigue. Denies chest pain, dyspnea, palpitations and peripheral edema. The patient exercises intermittently. Weight trend: decreasing  "steadily.        She is currently taking atorvastatin 20 mg daily Current side effects include: none       As far as her anxiety, she continues to do well on sertraline 200 mg daily and alprazolam 0.5 mg tid. She underwent TMS at [facility] last summer and responded well to that treatment. She is hoping to taper down further with those, but has been on alprazolam regularly for several years and is nervous about changing her regimen.       She has stopped the topamax for her headaches and continues on imitrex prn.     The following portions of the patient's history were reviewed and updated as appropriate: allergies, current medications, past family history, past medical history, past social history, past surgical history and problem list.    Review of Systems  A 12 point comprehensive review of systems was negative except as noted.          Objective:        Vitals:    12/19/19 1037   BP: 138/84   Patient Position: Sitting   Cuff Size: Adult Regular   Pulse: 74   SpO2: 98%   Weight: 137 lb 3 oz (62.2 kg)   Height: 5' 4\" (1.626 m)          General:    Alert, cooperative, no distress   Head:    Normocephalic, without obvious abnormality, atraumatic   Eyes:    PERRL, conjunctiva/corneas clear, EOM's intact    Ears:    Normal TM's and external ear canals, both ears   Nose:   Nares normal, mucosa normal, no drainage or sinus tenderness   Throat:   Lips, mucosa, and tongue normal; teeth and gums normal   Neck:   Supple, symmetrical,  no adenopathy;  thyroid:  normal   Back:     Symmetric, ROM normal, no CVA tenderness   Lungs:     Clear to auscultation bilaterally, respirations unlabored   CV:    Regular rate and rhythm   Abdomen:     Soft, non-tender, no masses, no organomegaly   Extremities:   Extremities normal, atraumatic, no cyanosis or edema   Pulses:   2+ and symmetric all extremities   Skin:   Skin color, texture, turgor normal, no rashes or lesions   Neurologic:   normal strength and tone throughout     Results for " orders placed or performed in visit on 12/19/19   Bone Alkaline Phosphatase   Result Value Ref Range    Bone Specific Alkaline Phosphatase 27.8 ug/L   C-Telopeptide, Beta-Cross-Linked, Serum   Result Value Ref Range    C-Telopeptide, Beta-Cross-Linked, Serum 793 pg/mL   N-Telopeptide, Cross-Linked, Urine   Result Value Ref Range    Creatinine, Urine - per volume 121 mg/dL    NTx, Urine (nM BCE/mM Creatinine) 131

## 2021-06-05 ENCOUNTER — RECORDS - HEALTHEAST (OUTPATIENT)
Dept: FAMILY MEDICINE | Facility: CLINIC | Age: 61
End: 2021-06-05

## 2021-06-05 VITALS
HEART RATE: 64 BPM | SYSTOLIC BLOOD PRESSURE: 120 MMHG | DIASTOLIC BLOOD PRESSURE: 72 MMHG | OXYGEN SATURATION: 98 % | HEIGHT: 64 IN | WEIGHT: 156.25 LBS | BODY MASS INDEX: 26.67 KG/M2

## 2021-06-05 DIAGNOSIS — R92.8 OTHER ABNORMAL FINDINGS ON RADIOLOGICAL EXAMINATION OF BREAST: ICD-10-CM

## 2021-06-06 NOTE — TELEPHONE ENCOUNTER
CSA 5/13/2019  Last med check 12/19/2019  No Future appt scheduled    Controlled Substance Refill Request  Medication Name:   Requested Prescriptions     Pending Prescriptions Disp Refills     ALPRAZolam (XANAX) 0.5 MG tablet [Pharmacy Med Name: ALPRAZOLAM 0.5MG TABLETS] 120 tablet 0     Sig: TAKE 1 TO 2 TABLETS(0.5 TO 1 MG) BY MOUTH THREE TIMES DAILY AS NEEDED FOR ANXIETY     Date Last Fill: 11/18/19 RFx2  [facility]ed [facility]acy: [facility]  Submit electronically to pharmacy  Controlled Substance Agreement on file:

## 2021-06-07 NOTE — TELEPHONE ENCOUNTER
Alprazolam 120 qty is for 30 day supply.     No further action necessary. Pharmacist changed it to 30 day supply on their end and it went through insurance.

## 2021-06-07 NOTE — PROGRESS NOTES
"Yaritza Haskins is a 60 y.o. female who is being evaluated via a billable telephone visit.      The patient has been notified of following:     \"This telephone visit will be conducted via a call between you and your physician/provider. We have found that certain health care needs can be provided without the need for a physical exam.  This service lets us provide the care you need with a short phone conversation.  If a prescription is necessary we can send it directly to your pharmacy.  If lab work is needed we can place an order for that and you can then stop by our lab to have the test done at a later time.    If during the course of the call the physician/provider feels a telephone visit is not appropriate, you will not be charged for this service.\"         Yaritza Haskins complains of    Chief Complaint   Patient presents with     Sinus Problem     Pt c/o head pressure, runny bloody L side nostril, sore throat x one week. Denies fever, cough, sob, wheezing.      Medication Refill     Pt requests refill alprazolam     Upper Respiratory Symptoms     Patient was contacted for evaluation of headaches and sinus pressure, L>R . Symptoms began several days ago. Symptoms have been gradually worsening  since that time. Symptoms are associated with facial pain, bloody nasal drainage, nasal congestion, sore throat and tooth pain. She denies chills, fever and cough.Her grandson has had croup recently and her son has had similar sinus symptoms.         She was supposed to go to [facility] for a repeat scan and evaluation with the neurosurgeon due to her increased back and neck pain. That was cancelled due to the COVID-19 outbreak. She was also scheduled to get an infusion for Paget's disease as well. She has been staying close to home and was last out once last week.        She has chronic anxiety and depression and her anxiety level is surprisingly ok at this time. She continues on sertraline 200 mg daily and alprazolam " 0.5-1 mg up to three times a day, usually 4 tabs (2 mg) daily. She has been on this regimen for years and is fairly resistant to any changes. She did undergo TMS at [facility] in summer of 2019 which she responded to very well.     I have reviewed and updated the patient's Past Medical History, Social History, Family History and Medication List.    ALLERGIES  Patient has no known allergies.        Assessment/Plan:  1. Acute non-recurrent maxillary sinusitis  cefuroxime (CEFTIN) 500 MG tablet   2. Anxiety Disorder NOS  ALPRAZolam (XANAX) 0.5 MG tablet   3. Ependymoma Of The Spinal Cord     4. Paget disease of bone       We reviewed the likely/potential etiology(ies) for her respiratory symptoms and we will cover with Ceftin for presumed acute sinusitis. We reviewed use of OTC analgesics as well as increased fluids and rest, and she will call or return to clinic with any ongoing or worsening symptoms. As far as her anxiety, she will continue the sertraline and alprazolam as she has been. We discussed the potential for abuse or harm from misuse for the alprazolam, and she has a current treatment agreement in place, but it will  in May. We may need to print one off to mail to her for review and signature, then have her return it. She will otherwise f/u in 4-6 mos for routine med check/evaluation.        Phone call duration:  12 minutes    Deirdre Flores MD

## 2021-06-08 NOTE — TELEPHONE ENCOUNTER
Left a message for the patient to call back to discuss mammogram result. Please review with the patient when she calls back.

## 2021-06-08 NOTE — PROGRESS NOTES
OV  1/10/2017  Assessment:       1. Generalized anxiety disorder  sertraline (ZOLOFT) 100 MG tablet   2. Migraine     3. Mass of hip region, right  XR Hip Right 2 or More VWS   4. Facial skin lesion  Ambulatory referral to Dermatology   5. Need for influenza vaccination  Influenza, Seasonal,Quad Inj, 36+ MOS   6. Need for hepatitis C screening test  Hepatitis C Antibody (Anti-HCV)   7. Medication monitoring encounter  Comprehensive Metabolic Panel        Plan:        We reviewed the etiology and natural history for depression/anxiety and options for treatment. We elected to continue sertraline, and we discussed indications for re-evaluation of therapy. She has been on the xanax for years and has done well at this dosing for some time now. We reviewed the potential side effects, need to taper the medications gradually, and indications for urgent evaluation. We will check labs as noted and be in touch with those results For her hip findings, we will proceed with a x-ray to evaluate. I will have her see derm for the facial lesion. She will let me know if she has any significant side effects or concerns.  Should f/u in 6-8 mos.      Subjective:           Yaritza Haskins is a 56 y.o. female who presents for follow up of anxiety disorder. She has the following anxiety symptoms: feelings of losing control, irritable, racing thoughts and excessive worry and inability to relax. Symptoms have been stable recently. Current symptoms include fatigue and difficulty concentrating, feelings of losing control and racing thoughts. Patient denies depressed mood, feelings of worthlessness/guilt and psychomotor agitation and panic attacks. She denies current suicidal and homicidal ideation. Current treatment includes Xanax and zoloft. She complains of the following medication side effects: none.        Patient also presents for follow-up of migraine headaches. Headaches are occurring several times per month. Home treatment has  included topamax with some improvement. Work attendance or other daily activities are affected by the headaches. The patient denies numbness of extremities and speech difficulties. She has significant nausea due to the headaches.     The following portions of the patient's history were reviewed and updated as appropriate: allergies, current medications, past family history, past medical history, past social history, past surgical history and problem list.    Review of Systems  Pertinent items are noted in HPI.    Lesion on face - ? Getting bigger  Would like testing for Hep C  Hip mass noted on scan - wonders about follow up      Objective:     Visit Vitals     /80     Pulse 68     Wt 156 lb (70.8 kg)     BMI 25.96 kg/m2     Physical Exam:  GEN: Alert and oriented, NAD,  well nourished  SKIN:  Normal skin turgor, no lesions/rashes   HEENT: moist mucous membranes, no rhinorrhea.    NECK: Normal.  No adenopathy or thyromegaly.  CV: Regular rate and rhythm, no murmurs.   LUNGS: Clear to auscultation bilaterally.    ABDOMEN: Soft, non-tender, non-distended, no masses   EXTREMITY: No edema, cyanosis  NEURO: Grossly normal.     Mental Status Examination:  Dress, grooming, personal hygiene: Appropriate  Speech: Appropriate  Mood: Appropriate

## 2021-06-08 NOTE — TELEPHONE ENCOUNTER
Last Med Check: 3/24/20.    Next med check due on: 9/2020.    CSA on File: No.     Future Appointment Scheduled ? 5/9/19.    Last Med Refill? 3/24/20.    Radha Multani, CMA

## 2021-06-08 NOTE — TELEPHONE ENCOUNTER
----- Message from Deirdre Flores MD sent at 5/19/2020  9:58 PM CDT -----  Please CALL pt and notify: normal/bengn mammogram. Recommend continue annual 3D mammograms due to dense breast tissue.

## 2021-06-08 NOTE — TELEPHONE ENCOUNTER
Refill Approved    Rx renewed per Medication Renewal Policy. Medication was last renewed on 10/19/19, last OV 3/24/20.    Linh Merritt, [facility] Triage/Med Refill 6/14/2020     Requested Prescriptions   Pending Prescriptions Disp Refills     sertraline (ZOLOFT) 100 MG tablet [Pharmacy Med Name: SERTRALINE 100MG TABLETS] 270 tablet 1     Sig: TAKE 3 TABLETS BY MOUTH DAILY.       SSRI Refill Protocol  Passed - 6/12/2020  5:50 AM        Passed - PCP or prescribing provider visit in last year     Last office visit with prescriber/PCP: 12/19/2019 Deirdre Flores MD OR same dept: 12/19/2019 Deirdre Flores MD OR same specialty: 12/19/2019 Deirdre Flores MD  Last physical: 2/15/2018 Last MTM visit: Visit date not found   Next visit within 3 mo: Visit date not found  Next physical within 3 mo: Visit date not found  Prescriber OR PCP: Deirdre Flores MD  Last diagnosis associated with med order: 1. Generalized anxiety disorder  - sertraline (ZOLOFT) 100 MG tablet [Pharmacy Med Name: SERTRALINE 100MG TABLETS]; TAKE 3 TABLETS BY MOUTH DAILY.  Dispense: 270 tablet; Refill: 1    If protocol passes may refill for 12 months if within 3 months of last provider visit (or a total of 15 months).

## 2021-06-10 NOTE — PROGRESS NOTES
Assessment:         1. Anxiety Disorder NOS     2. Migraine without status migrainosus, not intractable, unspecified migraine type     3. Mixed hyperlipidemia  Lipid [place] FASTING    Comprehensive Metabolic Panel   4. Need for vaccination  Varicella Zoster, Recombinant Vaccine IM            Plan:          We reviewed her medications for anxiety and she will continue the sertraline and alprazolam. We discussed the potential for abuse or harm from misuse for the alprazolam, and we had her sign a new treatment agreement today. She will continue the imitrex as needed for her migraines and the lipitor for hyperlipidemia. Fasting labs were drawn. Blood pressure is under adequate control. We reviewed her current medications and she will continue the same pending additional lab results. We reviewed dietary recommendations, including low salt and high fiber diet, and recommendations for regular exercise/activity. Her second shingles vaccine was given today. She will plan to follow up in 4-6 mos for repeat fasting labs and med check, sooner if any difficulties.         Subjective:        Fasting today? Yes  Hypertension & Hyperlipidemia      Yaritza Haskins is a 60 y.o. female here for follow-up of elevated blood pressure. A repeat fasting lipid profile was done. Compliance with treatment has been good. Patient denies muscle pain associated with her medications. Associated signs and symptoms: none. Denies chest pain, dyspnea and peripheral edema. The patient exercises intermittently. Weight trend: stable.        Previous history of cardiac disease includes: none.          She saw a headache specialist at [facility] as well as her neurosurgeon due to her increased back and neck pain and headaches. She was diagnosed with hemicrania continua and treated with indomethacin. She reports headaches are left-sided and consist of continuous pressure for days. She has had infusions for Paget's disease as well. She has been staying  "close to home and spent some time at her cabin in [place] this summer.         She has chronic anxiety and depression and her anxiety level is surprisingly ok at this time. It has been improved since TMS at [place] last summer. She continues on sertraline 200 mg daily and alprazolam 0.5 mg in am and 1 mg at HS, usually 3 tabs daily with an occasional prn dose needed. She is due to renew her CSA today.    The following portions of the patient's history were reviewed and updated as appropriate: allergies, current medications, past family history, past medical history, past social history, past surgical history and problem list.    Review of Systems  A 12 point comprehensive review of systems was negative except as noted.          Objective:        Vitals:    08/28/20 0917   BP: 130/78   Patient Position: Sitting   Cuff Size: Adult Regular   Pulse: 61   SpO2: 98%   Weight: 152 lb (68.9 kg)   Height: 5' 4\" (1.626 m)          General:    Alert, cooperative, no distress   Head:    Normocephalic, without obvious abnormality, atraumatic   Eyes:    PERRL, conjunctiva/corneas clear, EOM's intact    Ears:    Normal TM's and external ear canals, both ears   Nose:   Nares normal, mucosa normal, no drainage or sinus tenderness   Throat:   Lips, mucosa, and tongue normal; teeth and gums normal   Neck:   Supple, symmetrical,  no adenopathy;  thyroid:  normal   Back:     Symmetric, ROM normal, no CVA tenderness   Lungs:     Clear to auscultation bilaterally, respirations unlabored   CV:    Regular rate and rhythm   Abdomen:     Soft, non-tender, no masses, no organomegaly   Extremities:   Extremities normal, atraumatic, no cyanosis or edema   Pulses:   2+ and symmetric all extremities   Skin:   Skin color, texture, turgor normal, no rashes or lesions   Neurologic:   normal strength and tone throughout     Results for orders placed or performed in visit on 08/28/20   Lipid [place] FASTING   Result Value Ref Range    Cholesterol 205 (H) " <=199 mg/dL    Triglycerides 94 <=149 mg/dL    HDL Cholesterol 60 >=50 mg/dL    LDL Calculated 126 <=129 mg/dL    Patient Fasting > 8hrs? Yes    Comprehensive Metabolic Panel   Result Value Ref Range    Sodium 143 136 - 145 mmol/L    Potassium 4.0 3.5 - 5.0 mmol/L    Chloride 105 98 - 107 mmol/L    CO2 29 22 - 31 mmol/L    Anion Gap, Calculation 9 5 - 18 mmol/L    Glucose 89 70 - 125 mg/dL    BUN 12 8 - 22 mg/dL    Creatinine 0.75 0.60 - 1.10 mg/dL    GFR MDRD Af Amer >60 >60 mL/min/1.73m2    GFR MDRD Non Af Amer >60 >60 mL/min/1.73m2    Bilirubin, Total 0.5 0.0 - 1.0 mg/dL    Calcium 9.6 8.5 - 10.5 mg/dL    Protein, Total 7.2 6.0 - 8.0 g/dL    Albumin 4.4 3.5 - 5.0 g/dL    Alkaline Phosphatase 66 45 - 120 U/L    AST 15 0 - 40 U/L    ALT 13 0 - 45 U/L

## 2021-06-13 NOTE — TELEPHONE ENCOUNTER
RN cannot approve Refill Request    RN can NOT refill this medication PCP to review dose. Last office visit: 8/28/2020 Deirdre Flores MD Last Physical: 2/15/2018 Last MTM visit: Visit date not found Last visit same specialty: 8/28/2020 Deirdre Flores MD.  Next visit within 3 mo: Visit date not found  Next physical within 3 mo: Visit date not found      Helen aNik, [facility] Triage/Med Refill 12/17/2020    Requested Prescriptions   Pending Prescriptions Disp Refills     sertraline (ZOLOFT) 100 MG tablet 270 tablet 1     Sig: Take 3 tablets (300 mg total) by mouth daily.       SSRI Refill Protocol  Passed - 12/16/2020  8:22 AM        Passed - PCP or prescribing provider visit in last year     Last office visit with prescriber/PCP: 8/28/2020 Deirdre Flores MD OR same dept: 8/28/2020 Deirdre Flores MD OR same specialty: 8/28/2020 Deirdre Flores MD  Last physical: 2/15/2018 Last MTM visit: Visit date not found   Next visit within 3 mo: Visit date not found  Next physical within 3 mo: Visit date not found  Prescriber OR PCP: Deirdre Flores MD  Last diagnosis associated with med order: 1. Generalized anxiety disorder  - sertraline (ZOLOFT) 100 MG tablet; Take 3 tablets (300 mg total) by mouth daily.  Dispense: 270 tablet; Refill: 1    If protocol passes may refill for 12 months if within 3 months of last provider visit (or a total of 15 months).

## 2021-06-13 NOTE — TELEPHONE ENCOUNTER
[facility] PA team  [phone]  Pool: HE PA MED ([id number])          PA has been initiated.       PA form completed and faxed insurance via Cover My Meds     Key:  COLIN PEREZ (Key: [id number])     Medication:  ALPRAZolam (XANAX) 0.5 MG tablet    Insurance:  BERYL/UCARE        Response will be received via fax and may take up to 5-10 business days depending on plan

## 2021-06-15 PROBLEM — D12.6 ADENOMATOUS COLON POLYP: Status: ACTIVE | Noted: 2017-01-29

## 2021-06-15 NOTE — PROGRESS NOTES
Assessment and Plan:     Patient has been advised of split billing requirements and indicates understanding: Yes     1. Routine adult health maintenance    2. Screening for malignant neoplasm of cervix  - Gynecologic Cytology (PAP Smear)  - HPV High Risk DNA Cervical    3. Moderate major depression (H)       She will continue the sertraline as directed and will call with any additional problems or concerns.  - sertraline (ZOLOFT) 100 MG tablet; Take 3 tablets (300 mg total) by mouth daily.  Dispense: 270 tablet; Refill: 1    4. Generalized anxiety disorder      She will continue with sertraline and alprazolam as she has been. We discussed the potential for abuse or harm from misuse for the alprazolam, and she has a current treatment agreement in place.    - sertraline (ZOLOFT) 100 MG tablet; Take 3 tablets (300 mg total) by mouth daily.  Dispense: 270 tablet; Refill: 1  - ALPRAZolam (XANAX) 0.5 MG tablet; TAKE 1-2 TABLETS BY MOUTH THREE TIMES DAILY AS NEEDED FOR ANXIETY  Dispense: 60 tablet; Refill: 0    5. Migraine Headaches      She will continue the imitrex prn and tigan prn for nausea.     6. Mixed hyperlipidemia      Labs were drawn as noted. We reviewed dietary recommendations, including low salt and high fiber diet, and  recommendations for regular exercise/activity.  We discussed limiting saturated and trans fats in her diet and using more of the good fats such as olive oil, canola oil, flax seed and peanut oil, etc.     - Lipid [place] FASTING  - Comprehensive Metabolic Panel  - HM2(CBC w/o Differential)    6. Medication monitoring encounter  - Comprehensive Metabolic Panel  - HM2(CBC w/o Differential)        The patient's current medical problems were reviewed.    I have had an Advance Directives discussion with the patient.  The following health maintenance schedule was reviewed with the patient and provided in printed form in the after visit summary:   Health Maintenance   Topic Date Due     DEPRESSION  ACTION PLAN  1960     Pneumococcal Vaccine: Pediatrics (0 to 5 Years) and At-Risk Patients (6 to 64 Years) (1 of 4 - PCV13) 02/18/1967     HIV SCREENING  02/18/1976     INFLUENZA VACCINE RULE BASED (1) 08/01/2020     MEDICARE ANNUAL WELLNESS VISIT  02/23/2022     MAMMOGRAM  05/15/2022     PAP SMEAR  02/15/2023     HPV TEST  02/15/2023     ADVANCE CARE PLANNING  02/15/2023     LIPID  08/28/2025     COLORECTAL CANCER SCREENING  04/22/2026     TD 18+ HE  10/24/2029     HEPATITIS C SCREENING  Completed     TDAP ADULT ONE TIME DOSE  Completed     ZOSTER VACCINES  Completed     HEPATITIS B VACCINES  Aged Out        Subjective:   Chief Complaint: Yaritza Haskins is an 61 y.o. female here for an Annual Wellness visit.   HPI:    Last fasting labs:   Hyperlipidemia & Hypertension      Yaritza Haskins is a 61 y.o. female who also presents for follow-up of dyslipidemia, anxiety and depression. Compliance with treatment has been good. Patient denies muscle pain associated with her medications. Current symptoms: none. Patient denies chest pain, dyspnea, exertional chest pressure/discomfort, lower extremity edema and near-syncope. The patient exercises several times per week. Weight trend: fluctuating a bit.      Previous history of cardiac disease includes: none.    Anxiety       Yaritza Haskins is a 61 y.o. female who presents for follow up of depression and anxiety. She has the following anxiety symptoms: difficulty concentrating, feelings of losing control, irritable and racing thoughts. Onset of symptoms was several years ago. Symptoms have been waxing and waning since that time, and are fairly well-controlled today. Current symptoms include feelings of worthlessness/guilt, loss of energy/fatigue and increased appetite. Patient denies depressed mood, hopelessness and recurrent thoughts of death. She denies current suicidal and homicidal ideation.       She has been taking sertraline 300 mg daily and  alprazolam 0.5 mg two at HS and then 2 times during the day. She has been stable at this dose for several years, and it has been working well. She denies any significant side effects.         Review of Systems:    12 point ROS negative except as noted above.      Please see above.  The rest of the review of systems are negative for all systems.    Patient Care Team:  Deirdre Flores MD as PCP - General  Deirdre Flores MD as Assigned PCP     Patient Active Problem List   Diagnosis     Ependymoma Of The Spinal Cord     Arthralgias In Multiple Sites     Irritable bowel syndrome with both constipation and diarrhea     Mixed hyperlipidemia     Anxiety Disorder NOS     Migraine Headache     Menopausal disorder     Adenomatous colon polyp     Controlled substance agreement signed     Paget disease of bone     Past Medical History:   Diagnosis Date     Anxiety      Depression       Past Surgical History:   Procedure Laterality Date     CHOLECYSTECTOMY       ID APPENDECTOMY      Description: Appendectomy;  Proc Date: 11/05/2004;     ID BX/EXCIS SPIN MATILDE,INDUR,INMED,THOR      Description: Laminectomy For Excision Of Intraspinal Neoplasm Intramedullary, Thoracic;  Proc Date: 01/09/2006;  Comments: Thoracic spinal cord ependymoma excised at [place]     ID LAP,FULGURATE/EXCISE LESIONS      Description: Laparoscopy With Fulguration Of Gynecologic Lesions;  Recorded: 02/14/2010;  Comments: 1980's      Family History   Problem Relation Age of Onset     No Medical Problems Mother      Cancer Father         Lymphoma     Stroke Father      No Medical Problems Sister      Depression Brother      Anxiety disorder Brother      Fibromyalgia Brother      Parkinsonism Brother      No Medical Problems Sister      No Medical Problems Sister      No Medical Problems Brother       Social History     Socioeconomic History     Marital status:      Spouse name: Not on file     Number of children: Not on file     Years of  education: Not on file     Highest education level: Not on file   Occupational History     Not on file   Social Needs     Financial resource strain: Not on file     Food insecurity     Worry: Not on file     Inability: Not on file     Transportation needs     Medical: Not on file     Non-medical: Not on file   Tobacco Use     Smoking status: Former Smoker     Smokeless tobacco: Never Used   Substance and Sexual Activity     Alcohol use: Yes     Drug use: No     Sexual activity: Not on file   Lifestyle     Physical activity     Days per week: Not on file     Minutes per session: Not on file     Stress: Not on file   Relationships     Social connections     Talks on phone: Not on file     Gets together: Not on file     Attends [employer] service: Not on file     Active member of club or organization: Not on file     Attends meetings of clubs or organizations: Not on file     Relationship status: Not on file     Intimate partner violence     Fear of current or ex partner: Not on file     Emotionally abused: Not on file     Physically abused: Not on file     Forced sexual activity: Not on file   Other Topics Concern     Not on file   Social History Narrative     Not on file      Current Outpatient Medications   Medication Sig Dispense Refill     ALPRAZolam (XANAX) 0.5 MG tablet TAKE 1-2 TABLETS BY MOUTH THREE TIMES DAILY AS NEEDED FOR ANXIETY 60 tablet 0     sertraline (ZOLOFT) 100 MG tablet Take 3 tablets (300 mg total) by mouth daily. 270 tablet 1     SUMAtriptan (IMITREX) 50 MG tablet Take 1 tablet (50 mg total) by mouth every 2 (two) hours as needed for migraine. 30 tablet 3     trimethobenzamide (TIGAN) 300 mg capsule Take 300 mg by mouth.       atorvastatin (LIPITOR) 20 MG tablet Take 1 tablet (20 mg total) by mouth at bedtime. 90 tablet 4     indomethacin (INDOCIN) 25 MG capsule Starting 8/29/2020       metroNIDAZOLE (METROGEL) 0.75 % gel JIMBO THIN LAYER TOPICALLY TO ENTIRE FACE HS.       spironolactone (ALDACTONE)  "50 MG tablet        No current facility-administered medications for this visit.       Objective:   Vital Signs:   Visit Vitals  /72 (Patient Position: Sitting, Cuff Size: Adult Regular)   Pulse 64   Ht 5' 4\" (1.626 m)   Wt 156 lb 4 oz (70.9 kg)   LMP 09/16/2016   SpO2 98%   BMI 26.82 kg/m       VisionScreening:  No exam data present   PHYSICAL EXAM  General: alert, pleasant, and no distress  Head: Normocephalic, without obvious abnormality, atraumatic  Eyes: conjunctivae and sclerae normal and pupils equal, round, reactive to   light and accomodation  Ears: normal TM's and external ear canals both ears  Nose: no discharge, no sinus tenderness  Throat: lips, mucosa, and tongue normal; teeth and gums normal  Neck: no adenopathy, supple, symmetrical, trachea midline and thyroid normal  Back: symmetric, ROM normal. No CVA tenderness.  Lungs: clear to auscultation bilaterally  Breasts: normal appearance, no masses or tenderness  Heart : regular rate and rhythm and no murmurs  Abdomen:  bowel sounds normal, no masses palpable and soft, non-tender  Pelvic: external genitalia normal,  vagina normal without discharge, cervix normal in appearance,   no cervical motion tenderness   Extremities: extremities normal, atraumatic, no cyanosis or edema  Pulses: 2+ and symmetric  Skin: Skin color, texture, turgor normal. No rashes or lesions  Lymph nodes: Cervical, supraclavicular, and axillary nodes normal.  Neurologic: Grossly normal            Assessment Results 2/23/2021   Activities of Daily Living No help needed   Instrumental Activities of Daily Living No help needed   Get Up and Go Score Less than 12 seconds   Mini Cog Total Score 5   Some recent data might be hidden     A Mini-Cog score of 0-2 suggests the possibility of dementia, score of 3-5 suggests no dementia    Identified Health Risks:     The patient was provided with suggestions to help her develop a healthy physical lifestyle.   She is at risk for lack of " exercise and has been provided with information to increase physical activity for the benefit of her well-being.  The patient was provided with written information regarding signs of hearing loss.  Information on urinary incontinence and treatment options given to patient.  The patient was provided with suggestions to help her develop a healthy emotional lifestyle.   The patient s PHQ-9 score is consistent with moderate depression.  She was provided with information regarding depression and was advised to schedule a follow up appointment in 4-6 mos to further address this issue.  She is at risk for falling and has been provided with information to reduce the risk of falling at home.  Information regarding advance directives ([place]), including where she can download the appropriate form, was provided to the patient via the AVS.

## 2021-06-15 NOTE — TELEPHONE ENCOUNTER
Please review - covering for Flores.     Alprazolam last filled on 8/28/20 for 120 tablets and 5 refills.  CSA on file from 8/28/20.  Last office visit: 8/28/20, was recommended to follow up in 4-6 months. Apt upcoming on 2/23.

## 2021-06-15 NOTE — TELEPHONE ENCOUNTER
Reason for Call:  Medication or medication refill:refill    Do you use a [facility]?  Name of the pharmacy and phone number for the current request: [facility]     Name of the medication requested: xanax    Other request: none    Can we leave a detailed message on this number? Yes    Phone number patient can be reached at: Home number on file [phone] (home)    Best Time: anytime    Call taken on 2/10/2021 at 9:10 AM by Arjun Waller

## 2021-06-15 NOTE — TELEPHONE ENCOUNTER
Refill Approved    Rx renewed per Medication Renewal Policy. Medication was last renewed on 11/19/19.    Roque Herrera, [facility] Triage/Med Refill 3/10/2021     Requested Prescriptions   Pending Prescriptions Disp Refills     SUMAtriptan (IMITREX) 50 MG tablet 30 tablet 3     Sig: Take 1 tablet (50 mg total) by mouth every 2 (two) hours as needed for migraine.       Triptans Refill Protocol Passed - 3/9/2021  3:41 PM        Passed - PCP or prescribing provider visit in past 12 months       Last office visit with prescriber/PCP: 8/28/2020 Deirdre Flores MD OR same dept: 8/28/2020 Deirdre Flores MD OR same specialty: 8/28/2020 Deirdre Flores MD  Last physical: 2/23/2021 Last MTM visit: Visit date not found   Next visit within 3 mo: Visit date not found  Next physical within 3 mo: Visit date not found  Prescriber OR PCP: Deirdre Flores MD  Last diagnosis associated with med order: 1. Migraine Headache  - SUMAtriptan (IMITREX) 50 MG tablet; Take 1 tablet (50 mg total) by mouth every 2 (two) hours as needed for migraine.  Dispense: 30 tablet; Refill: 3    If protocol passes may refill for 12 months if within 3 months of last provider visit (or a total of 15 months).

## 2021-06-16 PROBLEM — F32.1 MODERATE MAJOR DEPRESSION (H): Status: ACTIVE | Noted: 2021-02-23

## 2021-06-16 PROBLEM — M88.9 PAGET DISEASE OF BONE: Status: ACTIVE | Noted: 2020-04-04

## 2021-06-16 PROBLEM — Z79.899 CONTROLLED SUBSTANCE AGREEMENT SIGNED: Status: ACTIVE | Noted: 2018-02-15

## 2021-06-16 PROBLEM — R87.610 ASCUS OF CERVIX WITH NEGATIVE HIGH RISK HPV: Status: ACTIVE | Noted: 2018-02-15

## 2021-06-16 NOTE — PROGRESS NOTES
Assessment & Plan:        1. Routine adult health maintenance    2. Screening for malignant neoplasm of cervix  - Gynecologic Cytology (PAP Smear)  - HPV High Risk DNA Cervical    3. Generalized anxiety disorder     We reviewed her current regimen and she is doing well at this time. We will continue the sertraline and alprazolam prn. We discussed the potential for abuse or harm from misuse, and we had her sign a treatment agreement today.    - sertraline (ZOLOFT) 100 MG tablet; Take 3 tablets (300 mg total) by mouth daily.  Dispense: 270 tablet; Refill: 3    4. Mixed hyperlipidemia     We reviewed dietary recommendations, including low salt and high fiber diet, and  recommendations for regular exercise/activity. We discussed limiting saturated and trans fats in her diet and using more of the good fats such as olive oil, canola oil, flax seed and peanut oil, etc. We reviewed indications for treatment of lipids and she will consider that.     - Lipid Cascade  - Thyroid Cascade    5. Fatigue  - Thyroid Cascade    6. Medication monitoring encounter  - Comprehensive Metabolic Panel  - HM2(CBC w/o Differential)    7. Controlled substance agreement signed    Patient Counseling:    --Nutrition: Stressed importance of moderation in sodium/caffeine intake, saturated fat and cholesterol, caloric balance, sufficient intake of fresh fruits, vegetables, calcium, and iron.    --Discussed the importance of vitamin D and calcium supplements/intake, and the risks and benefits of daily use of baby aspirin.   --Discussed symptomatic management of hot flushes, night sweats, HRT, etc   --Exercise: Stressed the importance of regular weight-bearing exercise    --Substance Abuse: limit alcohol use    --Injury prevention: Discussed safety belts, distracted driving, fire safety    --Dental health: Discussed importance of regular tooth brushing, flossing, and dental visits.    --Discussed risks/benefits of screening colonoscopy, mammography  and the recommended intervals.    --Discussed DEXA screening.   --Discussed risks and benefits of regular breast self exams and evaluation with any changes    --Discussed pap frequency and indications for stopping screening.   --Immunizations reviewed.    --Advance Directives were reviewed and she was given a copy of the Honoring Choices Document.    Discussed the patient's BMI with her. The BMI is in the acceptable range     I have had an Advance Directives discussion with the patient.    Subjective:      Yaritza Haskins is a 57 y.o. female who presents for annual exam.       The patient reports concerns as noted below.       Do you have pain that bothers you in your daily life? yes, chronic back pain since spinal tumor surgery - sees [facility].       The patient reports that there is not domestic violence in her life.     Gynecologic History  Last mammogram: 2017. Results were: normal   History:  LMP: Patient's last menstrual period was 2013.  Menopause at 56 years  Last pap date:   Results were: normal  Abnormal pap history? no  : 2  Para:     Healthy Habits:   Regular Exercise: Yes  Sunscreen Use: Yes  Healthy Diet: Yes  Dental Visits Regularly: Yes  Seat Belt: Yes  Sexually active: Yes  Self Breast Exam Monthly:Yes  Colonoscopy: Yes  Lipid Profile: Yes  Glucose Screen: Yes  Prevention of Osteoporosis: Yes  Last Dexa: Yes  Guns at Home:  No    Immunization History   Administered Date(s) Administered     DT (pediatric) 2001     Hep A, historic 2008, 2008     Influenza [id number], 2009     Influenza, inj, historic,unspecified 2009     Influenza, seasonal,quad inj 36+ mos 2015, 2015, 01/10/2017     Td,adult,historic,unspecified 2008     Tdap 2008     ZOSTER 2015     Immunization status: up to date and documented.    The following portions of the patient's history were reviewed and updated as appropriate: allergies, current  "medications, past family history, past medical history, past social history, past surgical history and problem list.    Review of Systems  A 12 point comprehensive review of systems was negative except as noted.    imitrex seems to be upsetting stomach - last refill or so  Fatigue freq. Stress level is lower. Wonders about decreasing   Takes alprazolam at hs for anxiety - usu takes once daily, occ needs more  Low motivation  Chronic LBP - sees [facility] for hx of   Constipation alt with occ diarrhea, occ vomiting - diarrhea worse since GB out 2 yrs ago   Longstanding constipation - no relief with miralax, takes otc laxatives  Has tried metamucil daily     Objective:        /60  Pulse 60  Temp 98.5  F (36.9  C)  Ht 5' 4\" (1.626 m)  Wt 153 lb 3.2 oz (69.5 kg)  BMI 26.3 kg/m2  General: alert, pleasant, and no distress  Head: Normocephalic, without obvious abnormality, atraumatic  Eyes: conjunctivae and sclerae normal and pupils equal, round, reactive to   light and accomodation  Ears: normal TM's and external ear canals both ears  Nose: no discharge, no sinus tenderness  Throat: lips, mucosa, and tongue normal; teeth and gums normal  Neck: no adenopathy, supple, symmetrical, trachea midline and thyroid normal  Back: symmetric, ROM normal. No CVA tenderness.  Lungs: clear to auscultation bilaterally  Breasts: normal appearance, no masses or tenderness  Heart : regular rate and rhythm and no murmurs  Abdomen:  bowel sounds normal, no masses palpable and soft, non-tender  Pelvic: external genitalia normal,  vagina normal without discharge, cervix normal in appearance,   no cervical motion tenderness, uterus normal size and consistency  Extremities: extremities normal, atraumatic, no cyanosis or edema  Pulses: 2+ and symmetric  Skin: Skin color, texture, turgor normal. No rashes or lesions  Lymph nodes: Cervical, supraclavicular, and axillary nodes normal.  Neurologic: Grossly normal        Results for orders placed " or performed in visit on 02/15/18   Lipid Cascade   Result Value Ref Range    Cholesterol 327 (H) <=199 mg/dL    Triglycerides 128 <=149 mg/dL    HDL Cholesterol 53 >=50 mg/dL    LDL Calculated 248 (H) <=129 mg/dL    Patient Fasting > 8hrs? Yes    Comprehensive Metabolic Panel   Result Value Ref Range    Sodium 141 136 - 145 mmol/L    Potassium 4.0 3.5 - 5.0 mmol/L    Chloride 105 98 - 107 mmol/L    CO2 25 22 - 31 mmol/L    Anion Gap, Calculation 11 5 - 18 mmol/L    Glucose 93 70 - 125 mg/dL    BUN 14 8 - 22 mg/dL    Creatinine 0.78 0.60 - 1.10 mg/dL    GFR MDRD Af Amer >60 >60 mL/min/1.73m2    GFR MDRD Non Af Amer >60 >60 mL/min/1.73m2    Bilirubin, Total 0.7 0.0 - 1.0 mg/dL    Calcium 9.8 8.5 - 10.5 mg/dL    Protein, Total 7.7 6.0 - 8.0 g/dL    Albumin 4.4 3.5 - 5.0 g/dL    Alkaline Phosphatase 116 45 - 120 U/L    AST 18 0 - 40 U/L    ALT 18 0 - 45 U/L   HM2(CBC w/o Differential)   Result Value Ref Range    WBC 5.1 4.0 - 11.0 thou/uL    RBC 4.75 3.80 - 5.40 mill/uL    Hemoglobin 14.8 12.0 - 16.0 g/dL    Hematocrit 42.8 35.0 - 47.0 %    MCV 90 80 - 100 fL    MCH 31.1 27.0 - 34.0 pg    MCHC 34.5 32.0 - 36.0 g/dL    RDW 11.8 11.0 - 14.5 %    Platelets 217 140 - 440 thou/uL    MPV 7.3 7.0 - 10.0 fL   Thyroid Cascade   Result Value Ref Range    TSH 1.46 0.30 - 5.00 uIU/mL

## 2021-06-16 NOTE — TELEPHONE ENCOUNTER
Reason for Call:  Medication or medication refill:    Do you use a [facility]?  Name of the pharmacy and phone number for the current request: [facility] [address]  Name of the medication requested: alprazolam, pt states she uese to get a 120 mg refill and the last one was for much less. It was sent to the [facility] but needs this sent for the 120 mg to [facility] [address] and [facility] states they never got the imitrex rx on the 10th. So will need that refill sent to them as well  Other request:     Can we leave a detailed message on this number? Yes    Phone number patient can be reached at: Home number on file [phone] (home)    Best Time: asap as she is in the [facility], [facility] at this time    Call taken on 3/30/2021 at 11:40 AM by Catia Garcia

## 2021-06-16 NOTE — TELEPHONE ENCOUNTER
confirms pt usually gets 120 tablets per month. February, there must have been a partial refill for [facility]. Pt needs to get back on track with 120 per month.   Last refill 3/23/21 #60

## 2021-06-16 NOTE — TELEPHONE ENCOUNTER
RN cannot approve Refill Request    RN can NOT refill this medication patient reports taking medication differently. Last office visit: 8/28/2020 Deirdre Flores MD Last Physical: 2/23/2021 Last MTM visit: Visit date not found Last visit same specialty: 8/28/2020 Deirdre Flores MD.  Next visit within 3 mo: Visit date not found  Next physical within 3 mo: Visit date not found      Roque Herrera, [facility] Triage/Med Refill 4/4/2021    Requested Prescriptions   Pending Prescriptions Disp Refills     atorvastatin (LIPITOR) 20 MG tablet 90 tablet 4     Sig: Take 1 tablet (20 mg total) by mouth at bedtime.       Statins Refill Protocol (Hmg CoA Reductase Inhibitors) Passed - 4/2/2021  1:34 PM        Passed - PCP or prescribing provider visit in past 12 months      Last office visit with prescriber/PCP: 8/28/2020 Deirdre Flores MD OR same dept: 8/28/2020 Deirdre Flores MD OR same specialty: 8/28/2020 Deirdre Flores MD  Last physical: 2/23/2021 Last MTM visit: Visit date not found   Next visit within 3 mo: Visit date not found  Next physical within 3 mo: Visit date not found  Prescriber OR PCP: Deirdre Flores MD  Last diagnosis associated with med order: 1. Mixed hyperlipidemia  - atorvastatin (LIPITOR) 20 MG tablet; Take 1 tablet (20 mg total) by mouth at bedtime.  Dispense: 90 tablet; Refill: 4    If protocol passes may refill for 12 months if within 3 months of last provider visit (or a total of 15 months).

## 2021-06-16 NOTE — TELEPHONE ENCOUNTER
Medication:ALPRAZolam (XANAX) 0.5 MG tablet  Last Date Filled 2/23/21  Last appointment addressing medication use: 2/23/21      Taken as prescribed from physician notes? YES    CSA in last year: YES    Random Utox in last year: ..  (if any of the above answer NO - schedule with PCP)     Opioids + benzodiazepines? ..  (if the above answer YES - schedule with PCP every 6 months)       All responses suggest: ..

## 2021-06-16 NOTE — TELEPHONE ENCOUNTER
Controlled Substance Refill Request  Medication Name:   Requested Prescriptions     Pending Prescriptions Disp Refills     ALPRAZolam (XANAX) 0.5 MG tablet [Pharmacy Med Name: ALPRAZOLAM 0.5 MG TAB 0.5 Tablet] 60 tablet 0     Sig: TAKE 1-2 TABLETS BY MOUTH THREE TIMES DAILY AS NEEDED FOR ANXIETY     Date Last Fill: 2/23/21  Requested Pharmacy: [facility]  Submit electronically to pharmacy  Controlled Substance Agreement on file:   Encounter-Level CSA Scan Date - 02/15/2018:    Scan on 2/19/2018  1:30 PM: ALPRAZOLAM        Last office visit:  2/23/21

## 2021-06-17 NOTE — TELEPHONE ENCOUNTER
Prior Authorization Request  Who s requesting:  Pharmacy  Pharmacy Name and Location: [facility]   Medication Name: Phenazxopridine  MG tablets   Insurance Plan: [employer]   Insurance Member ID Number:  [id number]  CoverMyMeds Key: N/A  Informed patient that prior authorizations can take up to 10 business days for response:   Yes  Okay to leave a detailed message: No

## 2021-06-17 NOTE — TELEPHONE ENCOUNTER
Telephone Encounter by Maylin Culp at 11/25/2020  5:23 PM     Author: Maylin Culp Service: -- Author Type: Patient Access    Filed: 11/25/2020  5:24 PM Encounter Date: 11/25/2020 Status: Signed    : Maylin Culp (Patient Access)       PA APPROVED:    Approval start date: 10/26/2020  Approval end date:  11/25/2021    Pharmacy has been notified of approval and will contact patient when medication is ready for pickup.

## 2021-06-17 NOTE — TELEPHONE ENCOUNTER
Telephone Encounter by Ban Balbuena at 5/7/2021  2:53 PM     Author: Ban Balbuena Service: -- Author Type: --    Filed: 5/7/2021  2:56 PM Encounter Date: 5/3/2021 Status: Signed    : Ban Balbuena       PRIOR AUTHORIZATION DENIED    Denial Rational: Called insurance about message below.  Per rep Phenazopyridine is considered over the counter and this drug has not been found by FDA to be safe and effective.  Per medicare law medications that are over the counter or not FDA approved are not covered under Part D benefits.  Patient may get medication but will be responsible for cost.

## 2021-06-17 NOTE — TELEPHONE ENCOUNTER
Central PA team  [phone]  Pool: HE PA MED ([id number])          PA has been initiated.       PA form completed and faxed insurance via Cover My Meds     Key:  [id number]     Medication:  phenazopyridine (PYRIDIUM) 200 MG tablet    Insurance:  [facility]        Response will be received via fax and may take up to 5-10 business days depending on plan

## 2021-06-18 NOTE — PROGRESS NOTES
"Nutrition Assessment    Yaritza Haskins is a 57 yo female seen for out patient MNT inital assessment. She states a strong family history of high cholesterol, with her grandmother dying from complications of high cholesterol in her 50's as well as multiple relatives undergoing CABG procedures. She also endorses digestive issues. She states she is frequently constipated, followed by diarrhea. If she is constipated for long enough she states she has nausea/vomiting. Yaritza was seen by [facility] (RD reviewed 4/2016 note), no major physical abnormalities noted, pt aware she has diverticulosis. She presents with questions regarding FODMAPs diet as well as efficacy of genetic testing for food allergies.    She currently uses Citrucel fiber supplements (6/day) with no relief.    Referring diagnosis: Other Mixed hyperlipidemia (E78.2)    Height:  5'4\"  Most recent weight:  153 lbs  BMI:  26.3  BMI indication: 25-29.9 overweight   *Per pt, her weight fluctuates by as much as 20 lbs d/t constipation/diarrhea    Nutrition intervention that addressed medical nutrition therapy for above diagnosis: Dietary strategies for lowering cholesterol (limiting saturated and trans fats, limiting added sugars, increasing soluble fiber), frequent GI irritants, increasing fiber for heart and colon health    RD reviewed label reading and physical activity. Reviewed FODMAP diet structure, drawbacks, and benefits. Pt is not interested in pursuing it at this time.      Lifestyle changes made prior to nutrition session: Pt followed a diet low in saturated fat for years following the death of her grandmother-- skim milk, lean meats, etc. She does not drink soda or other sugar-sweetened beverages    Assessment of diet/weight history: Eats 1-2 meals/day due to GI complaints/pain. Her partner does the cooking and he is a fan of whole milk, red meat, and butter. Has red meat ~ 4 days/week, chicken and fish other days. Does have whole grain bread and " brown rice. Pt rarely drinks milk and has started buying her own carton of skim milk.     Avoids seeds, apples, fibrous foods, citrus, tomato juices/sauce/ketchup, mints/gum d/t GI concerns.    Assessment of physical activity: Limited d/t back pain. Keeps busy with ADLs. Walks 100 lb dog, although this does cause her pain. Water activities cause least amount of pain.    Goals:  1.) Slowly increase fiber in diet. Start with fruits/vegetables that are easier on your stomach. Make sure you stay adequately hydrated to lessen risk of constipation.  2.) Avoid acidic foods, spicy foods, fatty foods, seeds, and sugar alcohols. In particular, do not use sugar-free gum or mints d/t sugar alcohol content.   3.) Try swimming/water aerobics to increase physical activity while being gentle on your spine.  4.) Consider low FODMAP diet should GI symptoms not resolve/continue to worsen with gradual dietary increase in fiber. While it initially seems like an arduous undertaking, there is research to back up its efficacy in reducing IBS symptoms.    Patient had no barriers to learning, verbalized understanding, and compliance is expected.    Phone number provided for questions. Recommended follow-up in  as needed.    Thank you for this consult. Call me at [phone] for questions.

## 2021-06-19 NOTE — LETTER
Letter by Deirdre Flores MD at      Author: Deirdre Flores MD Service: -- Author Type: --    Filed:  Encounter Date: 5/9/2019 Status: (Other)         [facility] FAMILY MEDICINE/OB  05/09/19    Patient: Yaritza Haskins  YOB: 1960  Medical Record Number: [id number]  CSN: [id number]                                                                              Non-opioid Controlled Substance Agreement    I understand that my care provider has prescribed a controlled substance to help manage my condition(s). I am taking this medicine to help me function or work. I know this is strong medicine, and that it can cause serious side effects. Controlled substances can be sedating, addicting and may cause a dependency on the drug. They can affect my ability to drive or think, and cause depression. They need to be taken exactly as prescribed. Combining controlled substances with certain medicines or chemicals (such as cocaine, sedatives and tranquilizers, sleeping pills, meth) can be dangerous or even fatal. Also, if I stop controlled substances suddenly, I may have severe withdrawal symptoms.  If not helpful, I may be asked to stop them.    The risks, benefits, and side effects of these medicine(s) were explained to me. I agree that:    1. I will take part in other treatments as advised by my care team. This may be psychiatry or counseling, physical therapy, behavioral therapy, group treatment or a referral to a pain clinic. I will reduce or stop my medicine when my care team tells me to do so.  2. I will take my medicines as prescribed. I will not change the dose or schedule unless my care team tells me to. There will be no refills if I run out early.  I may be contactedwithout warning and asked to complete a urine drug test or pill count at any time.   3. I will keep all my appointments, and understand this is part of the monitoring of controlled substances. My care team may require  an office visit for EVERY controlled substance refill. If I miss appointments or dont follow instructions, my care team may stop my medicine.  4. I will not ask other providers to prescribe controlled substances, and I will not accept controlled substances from other people. If I need another prescribed controlled substance for a new reason, I will tell my care team within 1 business day.  5. I will use one pharmacy to fill all of my controlled substance prescriptions, and it is up to me to make sure that I do not run out of my medicines on weekends or holidays. If my care team is willing to refill my controlled substance prescription without a visit, I must request refills only during office hours, refills may take up to 3 days to process, and it may take up to 5 to 7 days for my medicine to be mailed and ready at my pharmacy. Prescriptions will not be mailed anywhere except my pharmacy.    6. I am responsible for my prescriptions. If the medicine/prescription is lost or stolen, it will not be replaced. I also agree not to share controlled substance medicines with anyone.          [facility] FAMILY MEDICINE/OB  05/09/19  Patient:  Yaritza Haskins  YOB: 1960  Medical Record Number: [id number]  CSN: [id number]    7. I agree to not use ANY illegal or recreational drugs. This includes marijuana, cocaine, bath salts or other drugs. I agree not to use alcohol unless my care team says I may. I agree to give urine samples whenever asked. If I dont give a urine sample, the care team may stop my medicine.    8. If I enroll in the [place] Medical Marijuana program, I will tell my care team. I will also sign an agreement to share my medical records with my care team.    9. I will bring in my list of medicines (or my medicine bottles) each time I come to the clinic.   10. I will tell my care team right away if I become pregnant or have a new medical problem treated outside of my regular clinic.  11. I  understand that this medicine can affect my thinking and judgment. It may be unsafe for me to drive, use machinery and do dangerous tasks. I will not do any of these things until I know how the medicine affects me. If my dose changes, I will wait to see how it affects me. I will contact my care team if I have concerns about medicine side effects.    I understand that if I do not follow any of the conditions above, my prescriptions or treatment may be stopped.      I agree that my provider, clinic care team, and pharmacy may work with any city, state or federal law enforcement agency that investigates the misuse, sale, or other diversion of my controlled medicine. I will allow my provider to discuss my care with or share a copy of this agreement with any other treating provider, pharmacy or emergency room where I receive care. I agree to give up (waive) any right of privacy or confidentiality with respect to these consents.   I have read this agreement and have asked questions about anything I did not understand.    ___________________________________________________________________________  Patient signature - Date/Time  -Yaritza Haskins                                      ___________________________________________________________________________  Witness signature                                                                    ___________________________________________________________________________  Provider signature- Deirdre Flores MD

## 2021-06-19 NOTE — LETTER
Letter by Deirdre Flores MD at      Author: Deirdre Flores MD Service: -- Author Type: --    Filed:  Encounter Date: 5/31/2019 Status: (Other)         June 9, 2019     Patient: Yaritza Haskins   YOB: 1960   Date of Visit: 5/31/2019       To Whom It May Concern:    I am the primary care provider for Yaritza Haskins for the last 11 years. She has a history of anxiety and depression and has been taking sertraline 200-300 mg daily and alprazolam 0.5 mg 3-4 x daily for several years now. She has previously been on paxil and wellbutrin and had significant side effects with both of those.    Unfortunately, I only have the EHR with notes back to 2005 to review and do not have details from prior to that. 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 She is on low dose topamax for migraine prevention, imitrex prn for acute headaches and lipitor for hyperlipidemia otherwise.     She has had an acute exacerbation of depression and anxiety recently following the breakup of a long-term relationship. She has been quite distraught and anxious and she is interested in participating in the TMS study as she is reluctant to change her current medication regimen given her past history of intolerance.    If you have any questions or concerns, please don't hesitate to call.    Sincerely,        Deirdre Flores MD

## 2021-06-19 NOTE — LETTER
Letter by Deirdre Flores MD at      Author: Deirdre Flores MD Service: -- Author Type: --    Filed:  Encounter Date: 10/29/2019 Status: Signed         Yaritza Haskins  [address] [place] [address]             October 29, 2019         Dear Ms. Haskins,    Below are the results from your recent visit:    Resulted Orders   Lipid [place] FASTING   Result Value Ref Range    Cholesterol 214 (H) <=199 mg/dL    Triglycerides 90 <=149 mg/dL    HDL Cholesterol 62 >=50 mg/dL    LDL Calculated 134 (H) <=129 mg/dL    Patient Fasting > 8hrs? Yes    Comprehensive Metabolic Panel   Result Value Ref Range    Sodium 143 136 - 145 mmol/L    Potassium 4.0 3.5 - 5.0 mmol/L    Chloride 106 98 - 107 mmol/L    CO2 28 22 - 31 mmol/L    Anion Gap, Calculation 9 5 - 18 mmol/L    Glucose 84 70 - 125 mg/dL    BUN 10 8 - 22 mg/dL    Creatinine 0.73 0.60 - 1.10 mg/dL    GFR MDRD Af Amer >60 >60 mL/min/1.73m2    GFR MDRD Non Af Amer >60 >60 mL/min/1.73m2    Bilirubin, Total 0.6 0.0 - 1.0 mg/dL    Calcium 9.8 8.5 - 10.5 mg/dL    Protein, Total 7.1 6.0 - 8.0 g/dL    Albumin 4.6 3.5 - 5.0 g/dL    Alkaline Phosphatase 115 45 - 120 U/L    AST 17 0 - 40 U/L    ALT 15 0 - 45 U/L    Narrative    Fasting Glucose reference range is 70-99 mg/dL per  American Diabetes Association (ADA) guidelines.       Normal electrolytes, liver and kidney functions. Lipids are a bit higher than previous. We may want to increase your lipitor assuming that you have been taking it regularly. Let me know if you are willing to increase the dose.     Please call with questions or contact us using [employer].    Sincerely,    Electronically signed by Deirdre Flores MD

## 2021-06-20 NOTE — PROGRESS NOTES
Chief Complaint   Patient presents with     Sinusitis     Pt has been having a cough, runny nose, itchy and water eyes for the last three weeks.     HPI: Patient presents with 3 weeks of symptoms.  When initially started as a sore throat and cold symptoms has now progressed to significant bilateral sinus pressure, tooth pain, a mildly productive cough with dark green sputum, increased chest congestion, and some ear pain.  Afebrile.  No known history of seasonal allergies.  There is not any known sick contacts.  Her last sinus infection was many years ago.  The patient does note that if treated with antibiotics she oftentimes will get yeast infections.    ROS:Review of Systems - History obtained from the patient  General ROS: positive for  - fatigue  Ophthalmic ROS: positive for - excessive tearing  ENT ROS: positive for - epistaxis, nasal congestion, nasal discharge and sore throat  Allergy and Immunology ROS: negative  Respiratory ROS: positive for - cough  Cardiovascular ROS: negative    SH: The Patient's  reports that she has quit smoking. She has never used smokeless tobacco. She reports that she drinks alcohol. She reports that she does not use illicit drugs.      FH: The Patient's family history includes Anxiety disorder in her brother; Cancer in her father; Depression in her brother; Fibromyalgia in her brother; No Medical Problems in her brother, mother, sister, sister, and sister; Parkinsonism in her brother; Stroke in her father.     Meds:    Current Outpatient Prescriptions on File Prior to Visit   Medication Sig Dispense Refill     ALPRAZolam (XANAX) 1 MG tablet TAKE 1/2 TO 1 TABLET BY MOUTH THREE TIMES DAILY AS NEEDED FOR ANXIETY 90 tablet 0     atorvastatin (LIPITOR) 10 MG tablet Take 1 tablet (10 mg total) by mouth at bedtime. 30 tablet 5     sertraline (ZOLOFT) 100 MG tablet Take 3 tablets (300 mg total) by mouth daily. 270 tablet 3     SUMAtriptan (IMITREX) 50 MG tablet Take 1 tablet (50 mg total)  by mouth every 2 (two) hours as needed for migraine. 30 tablet 5     topiramate (TOPAMAX) 50 MG tablet TAKE 1 TABLET BY MOUTH TWICE DAILY 180 tablet 3     No current facility-administered medications on file prior to visit.        O:  /60 (Patient Site: Left Arm, Patient Position: Sitting, Cuff Size: Adult Regular)  Pulse 60  Temp 98.4  F (36.9  C)  Wt 153 lb 3.2 oz (69.5 kg)  LMP 09/16/2016  BMI 26.3 kg/m2    Physical Examination:   General appearance - alert, well appearing, and in no distress  Mental status - alert, oriented to person, place, and time  Eyes - pupils equal and reactive, extraocular eye movements intact  Ears - bilateral TM's and external ear canals normal  Nose -swollen sinuses.  Some dried bloody drainage.  Mouth - mucous membranes moist, pharynx normal without lesions  Neck - supple, no significant adenopathy  Lymphatics - no palpable lymphadenopathy, no hepatosplenomegaly  Chest - clear to auscultation, no wheezes, rales or rhonchi, symmetric air entry  Heart - normal rate and regular rhythm, S1 and S2 normal, no murmurs noted  Extremities - peripheral pulses normal, no pedal edema, no clubbing or cyanosis  Skin - normal coloration and turgor, no rashes, no suspicious skin lesions noted      A/P:     Problem List Items Addressed This Visit     None      Visit Diagnoses     Sinus infection    -  Primary    Relevant Medications    azithromycin (ZITHROMAX Z-LEONIDES) 250 MG tablet    Yeast infection of the vagina        Relevant Medications    fluconazole (DIFLUCAN) 150 MG tablet    Needs flu shot        Relevant Orders    Influenza, Seasonal,Quad Inj, 36+ MOS (Completed)            1. Sinus infection  Prescription sent to the pharmacy.  Let us know if symptoms fail to improve as anticipated  - azithromycin (ZITHROMAX Z-LEONIDES) 250 MG tablet; Take 2 tablets (500 mg) on  Day 1,  followed by 1 tablet (250 mg) once daily on Days 2 through 5.  Dispense: 6 tablet; Refill: 0    2. Yeast infection of  the vagina  Prescription for fluconazole sent to the pharmacy to take as needed if she does develop a yeast infection as she has in the past  - fluconazole (DIFLUCAN) 150 MG tablet; Take 1 dose with symptoms. If still symptomatic after 3 days, take another  Dispense: 2 tablet; Refill: 0    3. Needs flu shot  - Influenza, Seasonal,Quad Inj, 36+ MOS        Earl Laughlin, CNP    This transcription was created utilizing voice recognition software and may contain typographical errors.

## 2021-06-20 NOTE — PROGRESS NOTES
Assessment:         1. Mixed hyperlipidemia  Lipid [place] FASTING    Comprehensive Metabolic Panel   2. Anxiety Disorder NOS     3. Ependymoma Of The Spinal Cord     4. Migraine     5. Irritable bowel syndrome with both constipation and diarrhea              Plan:          Fasting labs were drawn. Blood pressure is under good control. We reviewed her current medications and she will continue the same pending additional lab results. We reviewed dietary recommendations, including low salt and high fiber diet, and recommendations for regular exercise/activity. As far as her anxiety, she will continue the xanax and sertraline as she has been. We completed a CSA in February for that and her usage has been stable. I have no concerns regarding overuse, but she will need to taper the medication if she wishes to stop it in the future. She will otherwise continue her same medications and will plan to follow up in 4-6 mos for repeat fasting labs and med check, sooner if any difficulties.         Subjective:        Fasting today? Yes   Hyperlipidemia & Hypertension      Patient is here for follow-up of hypertension and dyslipidemia. A repeat fasting lipid profile was done. Compliance with treatment has been good. Patient denies muscle pain associated with her medications. Current symptoms: none. Patient denies chest pain, dyspnea, exertional chest pressure/discomfort, irregular heart beat, lower extremity edema and near-syncope. The patient exercises intermittently. Weight trend: fluctuating a bit.      Previous history of cardiac disease includes: none. FH strong for AAA , CVA, Migraine.   Anxiety       Yaritza GARZA Caityon is a 58 y.o. female who presents for follow up of anxiety disorder. She has the following anxiety symptoms: . Onset of symptoms was  several years ago. Symptoms have been controlled recently. Patient denies anhedonia, depressed mood and difficulty concentrating and panic attacks. She denies current  suicidal and homicidal ideation.       Current treatment includes Xanax and Zoloft. She complains of the following medication side effects: none.     The following portions of the patient's history were reviewed and updated as appropriate: allergies, current medications, past family history, past medical history, past social history, past surgical history and problem list.    Review of Systems  Pertinent items are noted in HPI.  Had uri symptoms a few weeks ago - cough resolved, ears plugged  Hx empendymoma of spinal cord - Chronic back pain since surgery- went to [place] this summer  More thoracic and cervical degenerative change  Cleared for chiropractic  IBS - diarrhea alt with constipation, stable     Objective:        Vitals:    10/04/18 1601   BP: 126/80   Pulse: 66   SpO2: 98%   Weight: 149 lb (67.6 kg)          General:    Alert, cooperative, no distress   Head:    Normocephalic, without obvious abnormality, atraumatic   Eyes:    PERRL, conjunctiva/corneas clear, EOM's intact    Ears:    Normal TM's and external ear canals, both ears   Nose:   Nares normal, mucosa normal, no drainage or sinus tenderness   Throat:   Lips, mucosa, and tongue normal; teeth and gums normal   Neck:   Supple, symmetrical,  no adenopathy;  thyroid:  normal   Back:     Symmetric, ROM normal, no CVA tenderness   Lungs:     Clear to auscultation bilaterally, respirations unlabored   CV:    Regular rate and rhythm   Abdomen:     Soft, non-tender, no masses, no organomegaly   Extremities:   Extremities normal, atraumatic, no cyanosis or edema   Pulses:   2+ and symmetric all extremities   Skin:   Skin color, texture, turgor normal, no rashes or lesions   Neurologic:   normal strength and tone throughout         Results for orders placed or performed in visit on 10/04/18   Lipid [place] FASTING   Result Value Ref Range    Cholesterol 192 <=199 mg/dL    Triglycerides 83 <=149 mg/dL    HDL Cholesterol 58 >=50 mg/dL    LDL Calculated 117 <=129  mg/dL    Patient Fasting > 8hrs? Yes    Comprehensive Metabolic Panel   Result Value Ref Range    Sodium 143 136 - 145 mmol/L    Potassium 3.8 3.5 - 5.0 mmol/L    Chloride 108 (H) 98 - 107 mmol/L    CO2 23 22 - 31 mmol/L    Anion Gap, Calculation 12 5 - 18 mmol/L    Glucose 93 70 - 125 mg/dL    BUN 10 8 - 22 mg/dL    Creatinine 0.79 0.60 - 1.10 mg/dL    GFR MDRD Af Amer >60 >60 mL/min/1.73m2    GFR MDRD Non Af Amer >60 >60 mL/min/1.73m2    Bilirubin, Total 0.9 0.0 - 1.0 mg/dL    Calcium 10.0 8.5 - 10.5 mg/dL    Protein, Total 7.2 6.0 - 8.0 g/dL    Albumin 4.5 3.5 - 5.0 g/dL    Alkaline Phosphatase 129 (H) 45 - 120 U/L    AST 19 0 - 40 U/L    ALT 21 0 - 45 U/L

## 2021-06-20 NOTE — LETTER
Letter by Deirdre Flores MD at      Author: Deirdre Flores MD Service: -- Author Type: --    Filed:  Encounter Date: 12/22/2019 Status: Signed         Yaritza Haskins  [address] [place] [address]             December 22, 2019         Dear Ms. Haskins,    Below are the results from your recent visit:    Resulted Orders   Bone Alkaline Phosphatase   Result Value Ref Range    Bone Specific Alkaline Phosphatase 27.8 ug/L      Comment:        INTERPRETIVE INFORMATION: Bone Specific Alkaline Phosphatase    Premenopausal Female:    4.5 - 16.9 ug/L    Postmenopausal Female:   7.0 - 22.4 ug/L  INTERPRETIVE INFORMATION: Bone Specific Alkaline Phosphatase  Liver alkaline phosphatase can affect the measurement of bone   specific alkaline phosphatase in this assay. Each 100 U/L of liver   alkaline phosphatase contributes an additional 2.5 to 5.8 ug/L to   the bone specific alkaline phosphatase result.  Performed by [facility],  [address] [facility] [facility] [place] [address] [phone]  www.[facility], Alexis Puga MD, Lab. Director   C-Telopeptide, Beta-Cross-Linked, Serum   Result Value Ref Range    C-Telopeptide, Beta-Cross-Linked, Serum 793 pg/mL      Comment:         Postmenopausal Females: 104-1008 pg/mL  REFERENCE INTERVAL: C-Telopeptide, Beta-Cross-Linked, Serum    Access complete set of age- and/or gender-specific reference   intervals for this test in the [facility] Test Directory   ([email or web address]).  Performed by [facility],  [address] [facility] [place] [address] [phone]  www.[facility], Alexis Puga MD, Lab. Director   N-Telopeptide, Cross-Linked, Urine   Result Value Ref Range    Creatinine, Urine - per volume 121 mg/dL      Comment:      Performed by [facility],  [address] [facility] [place] [address] [phone]  www.[facility], Alexis Puga MD, Lab. Director    NTx, Urine (nM BCE/mM Creatinine) 131       Comment:        Normal adult female:       Premenopausal:  17-94 nM  BCE/mM creatinine     Postmenopausal:  nM BCE/mM creatinine  INTERPRETIVE INFORMATION: N-Telopeptide, Cross Linked, Urine    NTx Units = nM BCE/mM creatinine    A decrease of 30-40% from the NTx baseline after 3 months of   therapy is a typical response to anti-resorptive therapy.    NTx = Cross-linked N-telopeptide of Type I Collagen  BCE = Bone Collagen Equivalent    Access complete set of age- and/or gender-specific reference   intervals for this test in the [facility] Test Directory   ([employer]).       Labs are mildly elevated, consistent with Paget's. Recommend consultation with endocrinology as scheduled.     Please call with questions or contact us using [employer].    Sincerely,    Electronically signed by Deirdre Flores MD

## 2021-06-20 NOTE — LETTER
Letter by Deirdre Flores MD at      Author: Deirdre Flores MD Service: -- Author Type: --    Filed:  Encounter Date: 8/31/2020 Status: (Other)         Yaritza Haskins  [address] [place] [address]             August 31, 2020         Dear Ms. Haskins,    Below are the results from your recent visit:    Resulted Orders   Lipid Fowler FASTING   Result Value Ref Range    Cholesterol 205 (H) <=199 mg/dL    Triglycerides 94 <=149 mg/dL    HDL Cholesterol 60 >=50 mg/dL    LDL Calculated 126 <=129 mg/dL    Patient Fasting > 8hrs? Yes    Comprehensive Metabolic Panel   Result Value Ref Range    Sodium 143 136 - 145 mmol/L    Potassium 4.0 3.5 - 5.0 mmol/L    Chloride 105 98 - 107 mmol/L    CO2 29 22 - 31 mmol/L    Anion Gap, Calculation 9 5 - 18 mmol/L    Glucose 89 70 - 125 mg/dL    BUN 12 8 - 22 mg/dL    Creatinine 0.75 0.60 - 1.10 mg/dL    GFR MDRD Af Amer >60 >60 mL/min/1.73m2    GFR MDRD Non Af Amer >60 >60 mL/min/1.73m2    Bilirubin, Total 0.5 0.0 - 1.0 mg/dL    Calcium 9.6 8.5 - 10.5 mg/dL    Protein, Total 7.2 6.0 - 8.0 g/dL    Albumin 4.4 3.5 - 5.0 g/dL    Alkaline Phosphatase 66 45 - 120 U/L    AST 15 0 - 40 U/L    ALT 13 0 - 45 U/L    Narrative    Fasting Glucose reference range is 70-99 mg/dL per  American Diabetes Association (ADA) guidelines.       Normal electrolytes, liver and kidney functions. Lipids are stable, but ideally the LDL should be <100. Recommend continue to work on high fiber, low saturated fat diet and regular exercise.     Please call with questions or contact us using [employer].    Sincerely,      Electronically signed by Deirdre Flores MD

## 2021-06-20 NOTE — LETTER
Letter by Deirdre Flores MD at      Author: Deirdre Flores MD Service: -- Author Type: --    Filed:  Encounter Date: 8/28/2020 Status: (Other)         August 28, 2020     Patient: Yaritza Haskins   YOB: 1960   Date of Visit: 8/28/2020       To Whom it May Concern:    Yaritza Haskins was seen in my clinic on 8/28/2020.    If you have any questions or concerns, please don't hesitate to call.    Sincerely,         Electronically signed by Deirdre Flores MD

## 2021-06-20 NOTE — LETTER
Letter by Deirdre Flores MD at      Author: Deirdre Flores MD Service: -- Author Type: --    Filed:  Encounter Date: 8/26/2020 Status: (Other)         [facility]IC[facility]/OB  08/28/20    Patient: Yaritza Haskins  YOB: 1960  Medical Record Number: [id number]  CSN: [id number]                                                                              Non-opioid Controlled Substance Agreement    I understand that my care provider has prescribed a controlled substance to help manage my condition(s). I am taking this medicine to help me function or work. I know this is strong medicine, and that it can cause serious side effects. Controlled substances can be sedating, addicting and may cause a dependency on the drug. They can affect my ability to drive or think, and cause depression. They need to be taken exactly as prescribed. Combining controlled substances with certain medicines or chemicals (such as cocaine, sedatives and tranquilizers, sleeping pills, meth) can be dangerous or even fatal. Also, if I stop controlled substances suddenly, I may have severe withdrawal symptoms.  If not helpful, I may be asked to stop them.    The risks, benefits, and side effects of these medicine(s) were explained to me. I agree that:    1. I will take part in other treatments as advised by my care team. This may be psychiatry or counseling, physical therapy, behavioral therapy, group treatment or a referral to a pain clinic. I will reduce or stop my medicine when my care team tells me to do so.  2. I will take my medicines as prescribed. I will not change the dose or schedule unless my care team tells me to. There will be no refills if I run out early.  I may be contactedwithout warning and asked to complete a urine drug test or pill count at any time.   3. I will keep all my appointments, and understand this is part of the monitoring of controlled substances. My care team may require  an office visit for EVERY controlled substance refill. If I miss appointments or dont follow instructions, my care team may stop my medicine.  4. I will not ask other providers to prescribe controlled substances, and I will not accept controlled substances from other people. If I need another prescribed controlled substance for a new reason, I will tell my care team within 1 business day.  5. I will use one pharmacy to fill all of my controlled substance prescriptions, and it is up to me to make sure that I do not run out of my medicines on weekends or holidays. If my care team is willing to refill my controlled substance prescription without a visit, I must request refills only during office hours, refills may take up to 3 days to process, and it may take up to 5 to 7 days for my medicine to be mailed and ready at my pharmacy. Prescriptions will not be mailed anywhere except my pharmacy.    6. I am responsible for my prescriptions. If the medicine/prescription is lost or stolen, it will not be replaced. I also agree not to share controlled substance medicines with anyone.          [facility]ILY MEDICINE/OB  08/28/20  Patient:  Yaritza Haskins  YOB: 1960  Medical Record Number: [id number]  CSN: [id number]    7. I agree to not use ANY illegal or recreational drugs. This includes marijuana, cocaine, bath salts or other drugs. I agree not to use alcohol unless my care team says I may. I agree to give urine samples whenever asked. If I dont give a urine sample, the care team may stop my medicine.    8. If I enroll in the [place] Medical Marijuana program, I will tell my care team. I will also sign an agreement to share my medical records with my care team.    9. I will bring in my list of medicines (or my medicine bottles) each time I come to the clinic.   10. I will tell my care team right away if I become pregnant or have a new medical problem treated outside of my regular clinic.  11. I  understand that this medicine can affect my thinking and judgment. It may be unsafe for me to drive, use machinery and do dangerous tasks. I will not do any of these things until I know how the medicine affects me. If my dose changes, I will wait to see how it affects me. I will contact my care team if I have concerns about medicine side effects.    I understand that if I do not follow any of the conditions above, my prescriptions or treatment may be stopped.      I agree that my provider, clinic care team, and pharmacy may work with any city, state or federal law enforcement agency that investigates the misuse, sale, or other diversion of my controlled medicine. I will allow my provider to discuss my care with or share a copy of this agreement with any other treating provider, pharmacy or emergency room where I receive care. I agree to give up (waive) any right of privacy or confidentiality with respect to these consents.   I have read this agreement and have asked questions about anything I did not understand.    ___________________________________________________________________________  Patient signature - Date/Time  -Yaritza Haskins                                      ___________________________________________________________________________  Witness signature                                                                    ___________________________________________________________________________  Provider signature- Deirdre Flores MD

## 2021-06-21 NOTE — LETTER
Letter by Deirdre Flores MD at      Author: Deirdre Flores MD Service: -- Author Type: --    Filed:  Encounter Date: 2/23/2021 Status: (Other)         March 3, 2021     Patient: Yaritza Haskins   YOB: 1960   Date of Visit: 2/23/2021       To Whom it May Concern:    Yaritza Haskins was seen in my clinic on 2/23/2021.    If you have any questions or concerns, please don't hesitate to call.    Sincerely,         Electronically signed by Deirdre Flores MD

## 2021-06-21 NOTE — LETTER
Letter by Deirdre Flores MD at      Author: Deirdre Flores MD Service: -- Author Type: --    Filed:  Encounter Date: 3/3/2021 Status: (Other)         Yaritza Haskins  [address] [place] [id number]             March 3, 2021         Dear Ms. Haskins,    Below are the results from your recent visit:    Resulted Orders   Lipid Lerna FASTING   Result Value Ref Range    Cholesterol 256 (H) <=199 mg/dL    Triglycerides 107 <=149 mg/dL    HDL Cholesterol 58 >=50 mg/dL    LDL Calculated 177 (H) <=129 mg/dL    Patient Fasting > 8hrs? Yes    Comprehensive Metabolic Panel   Result Value Ref Range    Sodium 141 136 - 145 mmol/L    Potassium 3.8 3.5 - 5.0 mmol/L    Chloride 104 98 - 107 mmol/L    CO2 30 22 - 31 mmol/L    Anion Gap, Calculation 7 5 - 18 mmol/L    Glucose 86 70 - 125 mg/dL    BUN 12 8 - 22 mg/dL    Creatinine 0.76 0.60 - 1.10 mg/dL    GFR MDRD Af Amer >60 >60 mL/min/1.73m2    GFR MDRD Non Af Amer >60 >60 mL/min/1.73m2    Bilirubin, Total 0.8 0.0 - 1.0 mg/dL    Calcium 9.2 8.5 - 10.5 mg/dL    Protein, Total 7.2 6.0 - 8.0 g/dL    Albumin 4.6 3.5 - 5.0 g/dL    Alkaline Phosphatase 66 45 - 120 U/L    AST 15 0 - 40 U/L    ALT 11 0 - 45 U/L    Narrative    Fasting Glucose reference range is 70-99 mg/dL per  American Diabetes Association (ADA) guidelines.   HM2(CBC w/o Differential)   Result Value Ref Range    WBC 4.5 4.0 - 11.0 thou/uL    RBC 4.53 3.80 - 5.40 mill/uL    Hemoglobin 13.7 12.0 - 16.0 g/dL    Hematocrit 39.9 35.0 - 47.0 %    MCV 88 80 - 100 fL    MCH 30.2 27.0 - 34.0 pg    MCHC 34.3 32.0 - 36.0 g/dL    RDW 12.0 11.0 - 14.5 %    Platelets 230 140 - 440 thou/uL    MPV 9.0 7.0 - 10.0 fL       Normal electrolytes, CBC, liver and kidney functions. Lipids are very high. Strongly recommend resuming lipitor or similar to reduce your risk of heart attack or stroke. Recommend continue working on high fiber, low saturated fat diet and regular exercise. Recheck labs in 4-6 mos.    Please  call with questions or contact us using [email or web address].    Sincerely,        Electronically signed by Deirdre Flores MD

## 2021-06-21 NOTE — LETTER
Letter by Yaritza Mock RN at      Author: Yaritza Mock RN Service: -- Author Type: --    Filed:  Encounter Date: 3/2/2021 Status: (Other)         Yaritza AraizaHoldenShari  [address] [place] [id number]             March 2, 2021         Dear Ms. YorkSalomonon,    We are happy to inform you that your recent Pap smear and Human Papillomavirus (HPV) test results are normal and negative.    It is recommended that you have your next Pap smear and Human Papillomavirus (HPV) test in 5 years. You will also need to return to the clinic every year for an annual wellness visit.    If you have additional questions regarding this result, please contact our office and we will be happy to assist you.      Sincerely,    Your [facility] Care Team

## 2021-06-24 NOTE — TELEPHONE ENCOUNTER
Refill Approved    Rx renewed per Medication Renewal Policy. Medication was last renewed on 2/15/18, last OV 10/4/18.    Linh Merritt, [facility] Triage/Med Refill 2/24/2019     Requested Prescriptions   Pending Prescriptions Disp Refills     SUMAtriptan (IMITREX) 50 MG tablet [Pharmacy Med Name: SUMATRIPTAN 50MG TABLETS] 30 tablet 0     Sig: TAKE 1 TABLET BY MOUTH EVERY 2 HOURS AS NEEDED FOR MIGRAINE.    Triptans Refill Protocol Passed - 2/22/2019  7:24 AM       Passed - PCP or prescribing provider visit in past 12 months      Last office visit with prescriber/PCP: 10/4/2018 Deirdre Flores MD OR same dept: 10/4/2018 Deirdre Flores MD OR same specialty: 10/4/2018 Deirdre Flores MD  Last physical: 2/15/2018 Last MTM visit: Visit date not found   Next visit within 3 mo: Visit date not found  Next physical within 3 mo: Visit date not found  Prescriber OR PCP: Deirdre Flores MD  Last diagnosis associated with med order: There are no diagnoses linked to this encounter.  If protocol passes may refill for 12 months if within 3 months of last provider visit (or a total of 15 months).

## 2021-07-21 ENCOUNTER — RECORDS - HEALTHEAST (OUTPATIENT)
Dept: ADMINISTRATIVE | Facility: CLINIC | Age: 61
End: 2021-07-21

## 2021-07-22 ENCOUNTER — RECORDS - HEALTHEAST (OUTPATIENT)
Dept: FAMILY MEDICINE | Facility: CLINIC | Age: 61
End: 2021-07-22

## 2021-07-22 DIAGNOSIS — Z12.31 OTHER SCREENING MAMMOGRAM: ICD-10-CM

## 2021-08-06 NOTE — PATIENT INSTRUCTIONS - HE
Patient Instructions by Deirdre Flores MD at 2/23/2021 11:00 AM     Author: Deirdre Flores MD Service: -- Author Type: Physician    Filed: 2/23/2021 11:11 AM Encounter Date: 2/23/2021 Status: Signed    : Deirdre Flores MD (Physician)         Patient Education     Your Health Risk Assessment indicates you feel you are not in good physical health.    A healthy lifestyle helps keep the body fit and the mind alert. It helps protect you from disease, helps you fight disease, and helps prevent chronic disease (disease that doesn't go away) from getting worse. This is important as you get older and begin to notice twinges in muscles and joints and a decline in the strength and stamina you once took for granted. A healthy lifestyle includes good healthcare, good nutrition, weight control, recreation, and regular exercise. Avoid harmful substances and do what you can to keep safe. Another part of a healthy lifestyle is stay mentally active and socially involved.    Good healthcare     Have a wellness visit every year.     If you have new symptoms, let us know right away. Don't wait until the next checkup.     Take medicines exactly as prescribed and keep your medicines in a safe place. Tell us if your medicine causes problems.   Healthy diet and weight control     Eat 3 or 4 small, nutritious, low-fat, high-fiber meals a day. Include a variety of fruits, vegetables, and whole-grain foods.     Make sure you get enough calcium in your diet. Calcium, vitamin D, and exercise help prevent osteoporosis (bone thinning).     If you live alone, try eating with others when you can. That way you get a good meal and have company while you eat it.     Try to keep a healthy weight. If you eat more calories than your body uses for energy, it will be stored as fat and you will gain weight.     Recreation   Recreation is not limited to sports and team events. It includes any activity that provides relaxation,  interest, enjoyment, and exercise. Recreation provides an outlet for physical, mental, and social energy. It can give a sense of worth and achievement. It can help you stay healthy.       Patient Education     Exercise for a Healthier Heart  You may wonder how you can improve the health of your heart. If youre thinking about exercise, youre on the right track. You dont need to become an athlete, but you do need a certain amount of brisk exercise to help strengthen your heart. If you have been diagnosed with a heart condition, your doctor may recommend exercise to help stabilize your condition. To help make exercise a habit, choose safe, fun activities.       Be sure to check with your health care provider before starting an exercise program.    Why exercise?  Exercising regularly offers many healthy rewards. It can help you do all of the following:    Improve your blood cholesterol levels to help prevent further heart trouble    Lower your blood pressure to help prevent a stroke or heart attack    Control diabetes, or reduce your risk of getting this disease    Improve your heart and lung function    Reach and maintain a healthy weight    Make your muscles stronger and more limber so you can stay active    Prevent falls and fractures by slowing the loss of bone mass (osteoporosis)    Manage stress better  Exercise tips  Ease into your routine. Set small goals. Then build on them.  Exercise on most days. Aim for a total of 150 or more minutes of moderate to  vigorous intensity activity each week. Consider 40 minutes, 3 to 4 times a week. For best results, activity should last for 40 minutes on average. It is OK to work up to the 40 minute period over time. Examples of moderate-intensity activity is walking one mile in 15 minutes or 30 to 45 minutes of yard work.  Step up your daily activity level. Along with your exercise program, try being more active throughout the day. Walk instead of drive. Do more household  tasks or yard work.  Choose one or more activities you enjoy. Walking is one of the easiest things you can do. You can also try swimming, riding a bike, or taking an exercise class.  Stop exercising and call your doctor if you:    Have chest pain or feel dizzy or lightheaded    Feel burning, tightness, pressure, or heaviness in your chest, neck, shoulders, back, or arms    Have unusual shortness of breath    Have increased joint or muscle pain    Have palpitations or an irregular heartbeat      9088-2346 [employer]. [address] [place] [address]. All rights reserved. This information is not intended as a substitute for professional medical care. Always follow your healthcare professional's instructions.         Patient Education   Signs of Hearing Loss  Hearing loss is a problem shared by many people. In fact, it is one of the most common health conditions, particularly as people age. Most people over age 65 have some hearing loss, and by age 80, almost everyone does. Because hearing loss usually occurs slowly over the years, you may not realize your hearing ability has gotten worse.       Have your hearing checked  Contact your [facility] care provider if you:    Have to strain to hear normal conversation.    Have to watch other peoples faces very carefully to follow what theyre saying.    Need to ask people to repeat what theyve said.    Often misunderstand what people are saying.    Turn the volume of the television or radio up so high that others complain.    Feel that people are mumbling when theyre talking to you.    Find that the effort to hear leaves you feeling tired and irritated.    Notice, when using the phone, that you hear better with 1 ear than the other.    [id number] [employer]. [address] [place] [address]. All rights reserved. This information is not intended as a substitute for professional medical care. Always follow your healthcare professional's  instructions.         Patient Education   Urinary Incontinence, Female (Adult)  Urinary incontinence means loss of control of the bladder. This problem affects many women, especially as they get older. If you have incontinence, you may be embarrassed to ask for help. But know that this problem can be treated.  Types of Incontinence  There are different types of incontinence. Two of the main types are described here. You can have more than one type.    Stress incontinence. With this type, urine leaks when pressure (stress) is put on the bladder. This may happen when you cough, sneeze, or laugh. Stress incontinence most often occurs because the pelvic floor muscles that support the bladder and urethra are weak. This can happen after pregnancy and vaginal childbirth or a hysterectomy. It can also be due to excess body weight or hormone changes.    Urge incontinence (also called overactive bladder). With this type, a sudden urge to urinate is felt often. This may happen even though there may not be much urine in the bladder. The need to urinate often during the night is common. Urge incontinence most often occurs because of bladder spasms. This may be due to bladder irritation or infection. Damage to bladder nerves or pelvic muscles, constipation, and certain medicines can also lead to urge incontinence.  Treatment of urinary incontinence depends on the cause. Further evaluation is needed to find the type you have. This will likely include an exam and certain tests. Based on the results, you and your healthcare provider can then plan treatment. Until a diagnosis is made, the home care tips below can help relieve symptoms.  Home care    Do pelvic floor muscle exercises, if they are prescribed. The pelvic floor muscles help support the bladder and urethra. Many women find that their symptoms improve when doing special exercises that strengthen these muscles. To do the exercises contract the muscles you would use to stop  your stream of urine, but do this when youre not urinating. Hold for 10 seconds, then relax. Repeat 10 to 20 times in a row, at least 3 times a day. Your provider may give you other instructions for how to do the exercises and how often.    Keep a bladder diary. This helps track how often and how much you urinate over a set period of time. Bring this diary with you to your next visit with the provider. The information can help your provider learn more about your bladder problem.    Lose weight, if advised to by your provider. Excess weight puts pressure on the bladder. Your provider can help you create a weight-loss plan thats right for you. This may include exercising more and making certain diet changes.    Don't consume foods and drinks that may irritate the bladder. These can include alcohol and caffeinated drinks.    Quit smoking. Smoking and other tobacco use can lead to chronic cough that strains the pelvic floor muscles. Smoking may also damage the bladder and urethra. Talk with your provider about treatments or methods you can use to quit smoking.    If drinking large amounts of fluid causes you to have symptoms, you may be advised to limit your fluid intake. You may also be advised to drink most of your fluids during the day and to limit fluids at night.    If youre worried about urine leakage or accidents, you may wear absorbent pads to catch urine. Change the pads often. This helps reduce discomfort. It may also reduce the risk of skin or bladder infections.  Follow-up care  Follow up with your healthcare provider, or as directed. It may take some to find the right treatment for your problem. Your treatment plan may include special therapies or medicines. Certain procedures or surgery may also be options. Be sure to discuss any questions you have with your provider.  When to seek medical advice  Call the healthcare provider right away if any of these occur:    Fever of 100.4 F (38 C) or higher, or as  directed by your provider    Bladder pain or fullness    Abdominal swelling    Nausea or vomiting    Back pain    Weakness, dizziness or fainting  Date Last Reviewed: 10/1/2017    2636-9971 [employer]. [address] [facility], [place] [address]. All rights reserved. This information is not intended as a substitute for professional medical care. Always follow your healthcare professional's instructions.     Patient Education   Your Health Risk Assessment indicates you feel you are not in good emotional health.    Recreation   Recreation is not limited to sports and team events. It includes any activity that provides relaxation, interest, enjoyment, and exercise. Recreation provides an outlet for physical, mental, and social energy. It can give a sense of worth and achievement. It can help you stay healthy.    Mental Exercise and Social Involvement  Mental and emotional health is as important as physical health. Keep in touch with friends and family. Stay as active as possible. Continue to learn and challenge yourself.   Things you can do to stay mentally active are:    Learn something new, like a foreign language or musical instrument.     Play SCRABBLE or do crossword puzzles. If you cannot find people to play these games with you at home, you can play them with others on your computer through the Internet.     Join a games club--anything from card games to chess or checkers or lawn bowling.     Start a new hobby.     Go back to school.     Volunteer.     Read.     Keep up with world events.       Patient Education   Depression and Suicide in Older Adults  Nearly 2 million older Americans have some type of depression. Sadly, some of them even take their own lives. Yet depression among older adults is often ignored. Learn the warning signs. You may help spare a loved one needless pain. You may also save a life.       What Is Depression?  Depression is a mood disorder that affects the way you think and  feel. The most common symptom is a feeling of deep sadness. People who are depressed also may seem tired and listless. And nothing seems to give them pleasure. Its normal to grieve or be sad sometimes. But sadness lessens or passes with time. Depression rarely goes away or improves on its own. Other symptoms of depression are:    Sleeping more or less than normal    Eating more or less than normal    Having headaches, stomachaches, or other pains that dont go away    Feeling nervous, empty, or worthless    Crying a great deal    Thinking or talking about suicide or death    Feeling confused or forgetful  What Causes It?  The causes of depression arent fully known. Certain chemicals in the brain play a role. Depression does run in families. And life stresses can also trigger depression in some people. That may be the case with older adults. They often face great burdens, such as the death of friends or a spouse. They may have failing health. And they are more likely to be alone, lonely, or poor.  How You Can Help  Often, depressed people may not want to ask for help. When they do, they may be ignored. Or, they may receive the wrong treatment. You can help by showing parents and older friends love and support. If they seem depressed, help them find the right treatment. Talk to your doctor. Or contact a local mental health center, social service agency, or hospital. With modern treatment, no one has to suffer from depression.  Resources:    National [place] of Mental Health  428.834.4871  www.nimh.nih.gov    National [place] on Mental Illness  877.356.2784  www.[place].org    [facility]  193.369.5387  www.[facility].org    National Suicide Hotline  663.430.5950 (800-SUICIDE)      6045-9246 [employer] [address] [place] [address], [place] [address]. All rights reserved. This information is not intended as a substitute for professional medical care. Always follow your healthcare professional's  instructions.         Patient Education   Preventing Falls in the Home  As you get older, falls are more likely. Thats because your reaction time slows. Your muscles and joints may also get stiffer, making them less flexible. Illness, medications, and vision changes can also affect your balance. A fall could leave you unable to live on your own. To make your home safer, follow these tips:    Floors    Put nonskid pads under area rugs.    Remove throw rugs.    Replace worn floor coverings.    Tack carpets firmly to each step on carpeted stairs. Put nonskid strips on the edges of uncarpeted stairs.    Keep floors and stairs free of clutter and cords.    Arrange furniture so there are clear pathways.    Clean up any spills right away.    Bathrooms    Install grab bars in the tub or shower.    Apply nonskid strips or put a nonskid rubber mat in the tub or shower.    Sit on a bath chair to bathe.    Use bathmats with nonskid backing.    Lighting    Keep a flashlight in each room.    Put a nightlight along the pathway between the bedroom and the bathroom.    1623-5176 [employer]. [address] [place] [address]. All rights reserved. This information is not intended as a substitute for professional medical care. Always follow your healthcare professional's instructions.         Patient Education   Understanding Advance Care Planning  Advance care planning is the process of deciding ones own future medical care. It helps ensure that if you cant speak for yourself, your wishes can still be carried out. The plan is a series of legal documents that note a persons wishes. The documents vary by state. Advance care planning may be done when a person has a serious illness that is expected to get worse. It may be done before major surgery. And it can help you and your family be prepared in case of a major illness or injury. Advance care planning helps with making decisions at these times.       A health  care proxy is a person who acts as the voice of a patient when the patient cant speak for himself or herself. The name of this role varies by state. It may be called a Durable Medical Power of  or Durable Power of  for Healthcare. It may be called an agent, surrogate, or advocate. Or it may be called a representative or decision maker. It is an official duty that is identified by a legal document. The document also varies by state.    Why Is Advance Care Planning Important?  If a person communicates their healthcare wishes:    They will be given medical care that matches their values and goals.    Their family members will not be forced to make decisions in a crisis with no guidance.  Creating a Plan  Making an advance care plan is often done in 3 steps:    Thinking about ones wishes. To create an advance care plan, you should think about what kind of medical treatment you would want if you lose the ability to communicate. Are there any situations in which you would refuse or stop treatment? Are there therapies you would want or not want? And whom do you want to make decisions for you? There are many places to learn more about how to plan for your care. Ask your doctor or  for resources.    Picking a health care proxy. This means choosing a trusted person to speak for you only when you cant speak for yourself. When you cannot make medical decisions, your proxy makes sure the instructions in your advance care plan are followed. A proxy does not make decisions based on his or her own opinions. They must put aside those opinions and values if needed, and carry out your wishes.    Filling out the legal documents. There are several kinds of legal documents for advance care planning. Each one tells health care providers your wishes. The documents may vary by state. They must be signed and may need to be witnessed or notarized. You can cancel or change them whenever you wish. Depending on your  state, the documents may include a Healthcare Proxy form, Living Will, Durable Medical Power of , Advance Directive, or others.  The Familys Role  The best help a family can give is to support their loved ones wishes. Open and honest communication is vital. Family should express any concerns they have about the patients choices while the patient can still make decisions.    7614-7049 [employer]. [address] [place] [address]. All rights reserved. This information is not intended as a substitute for professional medical care. Always follow your healthcare professional's instructions.         Also, "[employer] [employer] [place] offers a free, downloadable health care directive that allows you to share your treatment choices and personal preferences if you cannot communicate your wishes. It also allows you to appoint another person (called a health care agent) to make health care decisions if you are unable to do so. You can download an advance directive by going here: [email or web address]     Patient Education   Personalized Prevention Plan  You are due for the preventive services outlined below.  Your care team is available to assist you in scheduling these services.  If you have already completed any of these items, please share that information with your care team to update in your medical record.  Health Maintenance   Topic Date Due   ? DEPRESSION ACTION PLAN  1960   ? Pneumococcal Vaccine: Pediatrics (0 to 5 Years) and At-Risk Patients (6 to 64 Years) (1 of 4 - PCV13) 02/18/1967   ? HIV SCREENING  02/18/1976   ? INFLUENZA VACCINE RULE BASED (1) 08/01/2020   ? MEDICARE ANNUAL WELLNESS VISIT  02/23/2022   ? MAMMOGRAM  05/15/2022   ? PAP SMEAR  02/15/2023   ? HPV TEST  02/15/2023   ? ADVANCE CARE PLANNING  02/15/2023   ? LIPID  08/28/2025   ? COLORECTAL CANCER SCREENING  04/22/2026   ? TD 18+ HE  10/24/2029   ? HEPATITIS C SCREENING  Completed   ? TDAP  ADULT ONE TIME DOSE  Completed   ? ZOSTER VACCINES  Completed   ? HEPATITIS B VACCINES  Aged Out

## 2021-09-23 ENCOUNTER — TELEPHONE (OUTPATIENT)
Dept: FAMILY MEDICINE | Facility: CLINIC | Age: 61
End: 2021-09-23

## 2021-09-23 NOTE — TELEPHONE ENCOUNTER
Left message to call back for: pt  Information to relay to patient: please help pt rescheudle apt on 9/30/2021, she is a more time pt and needs no less than 40 min for her apt. Please schedule her in a spot that allots for 40 min.

## 2021-09-30 ENCOUNTER — OFFICE VISIT (OUTPATIENT)
Dept: FAMILY MEDICINE | Facility: CLINIC | Age: 61
End: 2021-09-30
Payer: COMMERCIAL

## 2021-09-30 ENCOUNTER — HOSPITAL ENCOUNTER (OUTPATIENT)
Dept: MAMMOGRAPHY | Facility: CLINIC | Age: 61
Discharge: HOME | End: 2021-09-30
Attending: FAMILY MEDICINE | Admitting: FAMILY MEDICINE
Payer: COMMERCIAL

## 2021-09-30 VITALS
HEART RATE: 61 BPM | SYSTOLIC BLOOD PRESSURE: 122 MMHG | DIASTOLIC BLOOD PRESSURE: 68 MMHG | OXYGEN SATURATION: 97 % | HEIGHT: 64 IN | WEIGHT: 166.19 LBS | BODY MASS INDEX: 28.37 KG/M2

## 2021-09-30 DIAGNOSIS — Z12.31 VISIT FOR SCREENING MAMMOGRAM: ICD-10-CM

## 2021-09-30 DIAGNOSIS — M88.9 PAGET DISEASE OF BONE: ICD-10-CM

## 2021-09-30 DIAGNOSIS — Z11.4 SCREENING FOR HIV (HUMAN IMMUNODEFICIENCY VIRUS): ICD-10-CM

## 2021-09-30 DIAGNOSIS — E78.2 MIXED HYPERLIPIDEMIA: Primary | ICD-10-CM

## 2021-09-30 DIAGNOSIS — C72.0: ICD-10-CM

## 2021-09-30 DIAGNOSIS — G43.909 MIGRAINE WITHOUT STATUS MIGRAINOSUS, NOT INTRACTABLE, UNSPECIFIED MIGRAINE TYPE: ICD-10-CM

## 2021-09-30 DIAGNOSIS — Z23 FLU VACCINE NEED: ICD-10-CM

## 2021-09-30 DIAGNOSIS — F32.1 MODERATE MAJOR DEPRESSION (H): ICD-10-CM

## 2021-09-30 DIAGNOSIS — F41.1 GENERALIZED ANXIETY DISORDER: ICD-10-CM

## 2021-09-30 LAB
ALBUMIN SERPL-MCNC: 4.8 G/DL (ref 3.5–5)
ALP SERPL-CCNC: 78 U/L (ref 45–120)
ALT SERPL W P-5'-P-CCNC: 15 U/L (ref 0–45)
ANION GAP SERPL CALCULATED.3IONS-SCNC: 12 MMOL/L (ref 5–18)
AST SERPL W P-5'-P-CCNC: 17 U/L (ref 0–40)
BILIRUB SERPL-MCNC: 0.9 MG/DL (ref 0–1)
BUN SERPL-MCNC: 14 MG/DL (ref 8–22)
CALCIUM SERPL-MCNC: 9.9 MG/DL (ref 8.5–10.5)
CHLORIDE BLD-SCNC: 103 MMOL/L (ref 98–107)
CHOLEST SERPL-MCNC: 219 MG/DL
CO2 SERPL-SCNC: 25 MMOL/L (ref 22–31)
CREAT SERPL-MCNC: 0.79 MG/DL (ref 0.6–1.1)
FASTING STATUS PATIENT QL REPORTED: YES
GFR SERPL CREATININE-BSD FRML MDRD: 82 ML/MIN/1.73M2
GLUCOSE BLD-MCNC: 89 MG/DL (ref 70–125)
HDLC SERPL-MCNC: 58 MG/DL
HIV 1+2 AB+HIV1 P24 AG SERPL QL IA: NEGATIVE
LDLC SERPL CALC-MCNC: 147 MG/DL
POTASSIUM BLD-SCNC: 4.1 MMOL/L (ref 3.5–5)
PROT SERPL-MCNC: 7.6 G/DL (ref 6–8)
SODIUM SERPL-SCNC: 140 MMOL/L (ref 136–145)
TRIGL SERPL-MCNC: 70 MG/DL

## 2021-09-30 PROCEDURE — 77063 BREAST TOMOSYNTHESIS BI: CPT

## 2021-09-30 PROCEDURE — 36415 COLL VENOUS BLD VENIPUNCTURE: CPT | Performed by: FAMILY MEDICINE

## 2021-09-30 PROCEDURE — G0008 ADMIN INFLUENZA VIRUS VAC: HCPCS | Performed by: FAMILY MEDICINE

## 2021-09-30 PROCEDURE — 80061 LIPID PANEL: CPT | Performed by: FAMILY MEDICINE

## 2021-09-30 PROCEDURE — 99214 OFFICE O/P EST MOD 30 MIN: CPT | Mod: 25 | Performed by: FAMILY MEDICINE

## 2021-09-30 PROCEDURE — 90682 RIV4 VACC RECOMBINANT DNA IM: CPT | Performed by: FAMILY MEDICINE

## 2021-09-30 PROCEDURE — 87389 HIV-1 AG W/HIV-1&-2 AB AG IA: CPT | Performed by: FAMILY MEDICINE

## 2021-09-30 PROCEDURE — 80053 COMPREHEN METABOLIC PANEL: CPT | Performed by: FAMILY MEDICINE

## 2021-09-30 RX ORDER — SERTRALINE HYDROCHLORIDE 100 MG/1
200 TABLET, FILM COATED ORAL DAILY
Qty: 270 TABLET | Refills: 3 | Status: SHIPPED | OUTPATIENT
Start: 2021-09-30 | End: 2022-06-06

## 2021-09-30 RX ORDER — ALPRAZOLAM 0.5 MG
0.5 TABLET ORAL 3 TIMES DAILY
Qty: 90 TABLET | Refills: 5 | Status: SHIPPED | OUTPATIENT
Start: 2021-09-30 | End: 2021-10-29

## 2021-09-30 ASSESSMENT — ANXIETY QUESTIONNAIRES
6. BECOMING EASILY ANNOYED OR IRRITABLE: NEARLY EVERY DAY
2. NOT BEING ABLE TO STOP OR CONTROL WORRYING: SEVERAL DAYS
5. BEING SO RESTLESS THAT IT IS HARD TO SIT STILL: NOT AT ALL
1. FEELING NERVOUS, ANXIOUS, OR ON EDGE: SEVERAL DAYS
7. FEELING AFRAID AS IF SOMETHING AWFUL MIGHT HAPPEN: NOT AT ALL
3. WORRYING TOO MUCH ABOUT DIFFERENT THINGS: SEVERAL DAYS
GAD7 TOTAL SCORE: 7

## 2021-09-30 ASSESSMENT — MIFFLIN-ST. JEOR: SCORE: 1308.82

## 2021-09-30 ASSESSMENT — PATIENT HEALTH QUESTIONNAIRE - PHQ9
5. POOR APPETITE OR OVEREATING: SEVERAL DAYS
SUM OF ALL RESPONSES TO PHQ QUESTIONS 1-9: 12

## 2021-09-30 NOTE — LETTER
[facility] MECHELLE [place]  09/30/21  Patient: Yaritza Mccarthy  YOB: 1960  Medical Record Number: [id number]                                                                                  Non-Opioid Controlled Substance Agreement    This is an agreement between you and your provider regarding safe and appropriate use of controlled substances prescribed by your care team. Controlled substances are?medicines that can cause physical and mental dependence (abuse).     There are strict laws about having and using these medicines. We here at [facility] are  committed to working with you in your efforts to get better. To support you in this work, we'll help you schedule regular office appointments for medicine refills. If we must cancel or change your appointment for any reason, we'll make sure you have enough medicine to last until your next appointment.     As a Provider, I will:     Listen carefully to your concerns while treating you with respect.     Recommend a treatment plan that I believe is in your best interest and may involve therapies other than medicine.      Talk with you often about the possible benefits and the risk of harm of any medicine that we prescribe for you.    Assess the safety of this medicine and check how well it works.      Provide a plan on how to taper (discontinue or go off) using this medicine if the decision is made to stop its use.      ::  As a Patient, I understand controlled substances:       Are prescribed by my care provider to help me function or work and manage my condition(s).?    Are strong medicines and can cause serious side effects.       Need to be taken exactly as prescribed.?Combining controlled substances with certain medicines or chemicals (such as illegal drugs, alcohol, sedatives, sleeping pills, and benzodiazepines) can be dangerous or even fatal.? If I stop taking my medicines suddenly, I may have severe withdrawal symptoms.      The risks, benefits, and side effects of these medicine(s) were explained to me. I agree that:    1. I will take part in other treatments as advised by my care team. This may be psychiatry or counseling, physical therapy, behavioral therapy, group treatment or a referral to specialist.    2. I will keep all my appointments and understand this is part of the monitoring of controlled substances.?My care team may require an office visit for EVERY controlled substance refill. If I miss appointments or don t follow instructions, my care team may stop my medicine    3. I will take my medicines as prescribed. I will not change the dose or schedule unless my care team tells me to. There will be no refills if I run out early.      4. I may be asked to come to the clinic and complete a urine drug test or complete a pill count. If I don t give a urine sample or participate in a pill count, the care team may stop my medicine.    5. I will only receive controlled substance prescriptions from this clinic. If I am treated by another provider, I will tell them that I am taking controlled substances and that I have a treatment agreement with this provider. I will inform my [facility] care team within one business day if I am given a prescription for any controlled substance by another healthcare provider. My [facility] care team can contact other providers and pharmacists about my use of any medicines.    6. It is up to me to make sure that I don't run out of my medicines on weekends or holidays.?If my care team is willing to refill my prescription without a visit, I must request refills only during office hours. Refills may take up to 3 business days to process. I will use one pharmacy to fill all my controlled substance prescriptions. I will notify the clinic about any changes to my insurance or medicine availability.    7. I am responsible for my prescriptions. If the medicine/prescription is lost, stolen or  destroyed, it will not be replaced.?I also agree not to share controlled substance medicines with anyone.     8. I am aware I should not use any illegal or recreational drugs. I agree not to drink alcohol unless my care team says I can.     9. If I enroll in the [place] Medical Cannabis program, I will tell my care team before my next refill.    10. I will tell my care team right away if I become pregnant, have a new medical problem treated outside of my regular clinic, or have a change in my medicines.     11. I understand that this medicine can affect my thinking, judgment and reaction time.? Alcohol and drugs affect the brain and body, which can affect the safety of my driving. Being under the influence of alcohol or drugs can affect my decision-making, behaviors, personal safety and the safety of others. Driving while impaired (DWI) can occur if a person is driving, operating or in physical control of a car, motorcycle, boat, snowmobile, ATV, motorbike, off-road vehicle or any other motor vehicle ([place] Statute 169A.20). I understand the risk if I choose to drive or operate any vehicle or machinery.    I understand that if I do not follow any of the conditions above, my prescriptions or treatment may be stopped or changed.   I agree that my provider, clinic care team and pharmacy may work with any city, state or federal law enforcement agency that investigates the misuse, sale or other diversion of my controlled medicine. I will allow my provider to discuss my care with, or share a copy of, this agreement with any other treating provider, pharmacy or emergency room where I receive care.     I have read this agreement and have asked questions about anything I did not understand.    ________________________________________________________  Patient Signature - Yaritza Araiza Blshaneka     ___________________                   Date     ________________________________________________________  Provider Signature - Deirdre ADAMS  Flores, MD       ___________________                   Date     ________________________________________________________  Witness Signature (required if provider not present while patient signing)          ___________________                   Date

## 2021-09-30 NOTE — PROGRESS NOTES
Assessment & Plan:       ICD-10-CM    1. Mixed hyperlipidemia  E78.2 Lipid panel reflex to direct LDL Fasting     Comprehensive metabolic panel (BMP + Alb, Alk Phos, ALT, AST, Total. Bili, TP)   2. Generalized anxiety disorder  F41.1 ALPRAZolam (XANAX) 0.5 MG tablet     sertraline (ZOLOFT) 100 MG tablet   3. Moderate major depression (H)  F32.1 sertraline (ZOLOFT) 100 MG tablet   4. 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 Migraine without status migrainosus, not intractable, unspecified migraine type  G43.909    6. Paget disease of bone  M88.9    7. Screening for HIV (human immunodeficiency virus)  Z11.4 HIV Antigen Antibody Combo     HIV Antigen Antibody Combo   8. Flu vaccine need  Z23 INFLUENZA QUAD, RECOMBINANT, P-FREE (RIV4) (FLUBLOK)        Fasting labs were drawn. Blood pressure is under good control. We reviewed her current medications. Medication: continue current medication regimen unchanged pending additional results. We reviewed dietary recommendations, including low salt and high fiber diet, and recommendations for regular exercise/activity. She will continue the same imitrex dosing for her migraines. As far as her anxiety, she will continue the sertraline and alprazolam. We discussed the potential for abuse or harm from misuse for the alprazolam, and we had her sign a new treatment agreement today. She will plan to follow up in 4-6 mos for repeat fasting labs and med check, sooner if any difficulties.        Subjective:     Fasting today? Yes  Hypertension & Hyperlipidemia       Yaritza Mccarthy is a 61 year old female here for follow-up of hyperlipidemia, generalized anxiety, migraines and history of spinal ependymoma. A repeat fasting lipid profile was done. Compliance with treatment has been good. Patient denies muscle pain associated with her medications. She is currently taking atorvastatin 20 mg daily. Current side effects include: none. Associated signs and symptoms: none. Denies chest  "pain, chest pressure/discomfort, dyspnea, palpitations, near-syncope, lower extremity edema. The patient reports sporadic irregular exercise. Weight trend: has increased 10  pounds over last 6 mos. She had lost about 20 lb when her anxiety was severe.           Previous history of cardiac disease includes: none.         She also presents for follow up of depression and anxiety. She has the following anxiety symptoms: fatigue, restlessness, difficulty concentrating, feelings of losing control, irritable and racing thoughts. Onset of symptoms was several years ago. Symptoms have been waxing and waning since that time, and are fairly well-controlled today. Current symptoms include feelings of worthlessness/guilt, loss of energy/fatigue and increased appetite. Patient denies depressed mood, hopelessness and recurrent thoughts of death. She denies current suicidal and homicidal ideation.       She has been taking sertraline 200 mg daily and alprazolam 0.5 mg two at HS and then 2 times during the day. She has been stable at this dose for several years, and it has been working well. She denies any significant side effects.         The following portions of the patient's history were reviewed and updated as appropriate: allergies, current medications, past family history, past medical history, past social history, past surgical history and problem list.    Review of Systems  Has mammo appt today  Some ST recently, grandson dx with croup.  Has had COVID vaccine  12 point ROS negative except as noted above        Objective:      Vitals:    09/30/21 1025   BP: 122/68   Patient Position: Sitting   Cuff Size: Adult Large   Pulse: 61   SpO2: 97%   Weight: 75.4 kg (166 lb 3 oz)   Height: 1.626 m (5' 4\")     GEN: Alert and oriented, NAD, well nourished  SKIN:  Normal skin turgor, no lesions/rashes   HEENT: NC/AT, moist mucous membranes, no rhinorrhea.    NECK: Normal.  No adenopathy or thyromegaly.  CV: Regular rate and rhythm, no " murmurs.   LUNGS: Clear to auscultation bilaterally.    ABDOMEN: Soft, non-tender, non-distended, no masses   BACK: Normal  EXTREMITY: No edema, cyanosis  NEURO: Grossly normal.         Labs:  Results for orders placed or performed during the hospital encounter of 09/30/21   MA Screen Bilateral w/Albert     Status: None    Narrative    BILATERAL FULL FIELD DIGITAL SCREENING MAMMOGRAM WITH TOMOSYNTHESIS    Performed on: 9/30/21    Compared to: 05/15/2020, 10/05/2018, 05/16/2017, 04/25/2016, 10/06/2014,   09/30/2014, 09/25/2013, 04/27/2012, 02/11/2011, and 02/03/2011    Technique:  This study was evaluated with the assistance of Computer-Aided   Detection.  Breast Tomosynthesis was used in interpretation.    Findings: The breasts are heterogeneously dense, which may obscure small   masses.  There is no radiographic evidence of malignancy.     IMPRESSION: ACR BI-RADS Category 1: Negative    RECOMMENDED FOLLOW-UP: Annual routine screening mammogram    The results and recommendations of this examination will be communicated   to the patient.       Results for orders placed or performed in visit on 09/30/21   HIV Antigen Antibody Combo     Status: Normal   Result Value Ref Range    HIV Antigen Antibody Combo Negative Negative   Lipid panel reflex to direct LDL Fasting     Status: Abnormal   Result Value Ref Range    Cholesterol 219 (H) <=199 mg/dL    Triglycerides 70 <=149 mg/dL    Direct Measure HDL 58 >=50 mg/dL    LDL Cholesterol Calculated 147 (H) <=129 mg/dL    Patient Fasting > 8hrs? Yes    Comprehensive metabolic panel (BMP + Alb, Alk Phos, ALT, AST, Total. Bili, TP)     Status: Normal   Result Value Ref Range    Sodium 140 136 - 145 mmol/L    Potassium 4.1 3.5 - 5.0 mmol/L    Chloride 103 98 - 107 mmol/L    Carbon Dioxide (CO2) 25 22 - 31 mmol/L    Anion Gap 12 5 - 18 mmol/L    Urea Nitrogen 14 8 - 22 mg/dL    Creatinine 0.79 0.60 - 1.10 mg/dL    Calcium 9.9 8.5 - 10.5 mg/dL    Glucose 89 70 - 125 mg/dL    Alkaline  Phosphatase 78 45 - 120 U/L    AST 17 0 - 40 U/L    ALT 15 0 - 45 U/L    Protein Total 7.6 6.0 - 8.0 g/dL    Albumin 4.8 3.5 - 5.0 g/dL    Bilirubin Total 0.9 0.0 - 1.0 mg/dL    GFR Estimate 82 >60 mL/min/1.73m2

## 2021-09-30 NOTE — LETTER
October 22, 2021      Yaritza Mccarthy  [address] [address]        Dear Ms.Quast Mccarthy,    We are writing to inform you of your test results.      Resulted Orders   HIV Antigen Antibody Combo   Result Value Ref Range    HIV Antigen Antibody Combo Negative Negative   Lipid panel reflex to direct LDL Fasting   Result Value Ref Range    Cholesterol 219 (H) <=199 mg/dL    Triglycerides 70 <=149 mg/dL    Direct Measure HDL 58 >=50 mg/dL      Comment:      HDL Cholesterol Reference Range:     0-2 years:   No reference ranges established for patients under 2 years old  at [facility] for lipid analytes.    2-8 years:  Greater than 45 mg/dL     18 years and older:   Female: Greater than or equal to 50 mg/dL   Male:   Greater than or equal to 40 mg/dL    LDL Cholesterol Calculated 147 (H) <=129 mg/dL    Patient Fasting > 8hrs? Yes    Comprehensive metabolic panel (BMP + Alb, Alk Phos, ALT, AST, Total. Bili, TP)   Result Value Ref Range    Sodium 140 136 - 145 mmol/L    Potassium 4.1 3.5 - 5.0 mmol/L    Chloride 103 98 - 107 mmol/L    Carbon Dioxide (CO2) 25 22 - 31 mmol/L    Anion Gap 12 5 - 18 mmol/L    Urea Nitrogen 14 8 - 22 mg/dL    Creatinine 0.79 0.60 - 1.10 mg/dL    Calcium 9.9 8.5 - 10.5 mg/dL    Glucose 89 70 - 125 mg/dL    Alkaline Phosphatase 78 45 - 120 U/L    AST 17 0 - 40 U/L    ALT 15 0 - 45 U/L    Protein Total 7.6 6.0 - 8.0 g/dL    Albumin 4.8 3.5 - 5.0 g/dL    Bilirubin Total 0.9 0.0 - 1.0 mg/dL    GFR Estimate 82 >60 mL/min/1.73m2      Comment:      As of July 11, 2021, eGFR is calculated by the CKD-EPI creatinine equation, without race adjustment. eGFR can be influenced by muscle mass, exercise, and diet. The reported eGFR is an estimation only and is only applicable if the renal function is stable.       Kidneys, electrolytes, blood sugar look good.  Cholesterol levels have improved.    If you have any questions or concerns, please call the clinic at the number listed above.        Sincerely,      Deirdre Flores MD

## 2021-10-01 ASSESSMENT — ANXIETY QUESTIONNAIRES: GAD7 TOTAL SCORE: 7

## 2021-10-19 PROBLEM — F32.9 MAJOR DEPRESSION: Status: ACTIVE | Noted: 2021-02-23

## 2021-10-29 ENCOUNTER — TELEPHONE (OUTPATIENT)
Dept: FAMILY MEDICINE | Facility: CLINIC | Age: 61
End: 2021-10-29

## 2021-10-29 DIAGNOSIS — F41.1 GENERALIZED ANXIETY DISORDER: ICD-10-CM

## 2021-10-29 NOTE — TELEPHONE ENCOUNTER
Request for Alprazolam qty update.    Spoke with patient. She usually gets 120 every 30 days for her Alprazolam. She received #90 on 9/30/21 and would like to know why as this is not her usual amount.   Chart notes states 4 daily.   Last visit 9/30/21- no change made.     She would like this changed for her next refill. She will try and refill at pharmacy for another 90 due to Dr Flores being out of clinic however she would like it fixed for her next refill.       Please call when this has been corrected.

## 2021-11-02 RX ORDER — ALPRAZOLAM 0.5 MG
TABLET ORAL
Qty: 120 TABLET | Refills: 5 | Status: SHIPPED | OUTPATIENT
Start: 2021-11-02 | End: 2022-05-19

## 2022-03-10 ENCOUNTER — NURSE TRIAGE (OUTPATIENT)
Dept: NURSING | Facility: CLINIC | Age: 62
End: 2022-03-10
Payer: COMMERCIAL

## 2022-03-10 DIAGNOSIS — G43.909 MIGRAINE: ICD-10-CM

## 2022-03-10 DIAGNOSIS — G43.909 MIGRAINE WITHOUT STATUS MIGRAINOSUS, NOT INTRACTABLE, UNSPECIFIED MIGRAINE TYPE: Primary | ICD-10-CM

## 2022-03-10 RX ORDER — SUMATRIPTAN 50 MG/1
50 TABLET, FILM COATED ORAL
Qty: 30 TABLET | Refills: 0 | Status: SHIPPED | OUTPATIENT
Start: 2022-03-10 | End: 2022-06-06

## 2022-03-10 RX ORDER — SUMATRIPTAN 50 MG/1
50 TABLET, FILM COATED ORAL
Qty: 30 TABLET | Refills: 3 | Status: CANCELLED | OUTPATIENT
Start: 2022-03-10

## 2022-03-10 NOTE — TELEPHONE ENCOUNTER
"Pt calls re \"migraine.\"  \"On day 2.\"  Pt declines triage.  States \"I know this is my typical migraine.\"  \"Took Imitrex yesterday.\"  \"Just had one tablet left.\"  \"Did not solve it, because just had one tablet.\"  \"Two tablets would have solved it.\"    Has not tried any OTC analgesics.  States \"Those just make me vomit.\"  \"Getting close to vomiting.\"  Pt rates headache pain 5-to-6/10.  Pt reiterates this is her typical migraine.  Simply needs refill of Imitrex.  See separate Med Refill encounter created and processed and routed to PCP for auth.    Pharmacy verified in chart -> [facility], [facility].    (Note -> Pt has upcoming appt for Med Check scheduled April 25, 2022.)    Gayathri FERRERA Health Nurse Advisor     Reason for Disposition    Request for URGENT new prescription or refill of 'essential' medication (i.e., likelihood of harm to patient if not taken) and triager unable to fill per department policy     Serotonin Agonist requires provider review -> noted in Refill Request routed to PCP.    Protocols used: MEDICATION QUESTION CALL-A-OH      _______________________    COVID 19 Nurse Triage Plan/Patient Instructions    Please be aware that novel coronavirus (COVID-19) may be circulating in the community. If you develop symptoms such as fever, cough, or SOB or if you have concerns about the presence of another infection including coronavirus (COVID-19), please contact your health care provider or visit [email or web address].     Disposition/Instructions    Additional COVID19 information to add for patients.   How can I protect others?  If you have symptoms (fever, cough, body aches or trouble breathing): Stay home and away from others (self-isolate) until:    At least 10 days have passed since your symptoms started, And     You ve had no fever--and no medicine that reduces fever--for 1 full day (24 hours), And      Your other symptoms have resolved (gotten better).     If you don t have " "symptoms, but a test showed that you have COVID-19 (you tested positive):    Stay home and away from others (self-isolate). Follow the tips under \"How do I self-isolate?\" below for 10 days (20 days if you have a weak immune system).    You don't need to be retested for COVID-19 before going back to school or work. As long as you're fever-free and feeling better, you can go back to school, work and other activities after waiting the 10 or 20 days.     How do I self-isolate?    Stay in your own room, even for meals. Use your own bathroom if you can.     Stay away from others in your home. No hugging, kissing or shaking hands. No visitors.    Don t go to work, school or anywhere else.     Clean  high touch  surfaces often (doorknobs, counters, handles, etc.). Use a household cleaning spray or wipes. You ll find a full list on the EPA website:  www.epa.gov/pesticide-registration/list-n-disinfectants-use-against-sars-cov-2.    Cover your mouth and nose with a mask, tissue or washcloth to avoid spreading germs.    Wash your hands and face often. Use soap and water.    Caregivers in these groups are at risk for severe illness due to COVID-19:  o People 65 years and older  o People who live in a nursing home or long-term care facility  o People with chronic disease (lung, heart, cancer, diabetes, kidney, liver, immunologic)  o People who have a weakened immune system, including those who:  - Are in cancer treatment  - Take medicine that weakens the immune system, such as corticosteroids  - Had a bone marrow or organ transplant  - Have an immune deficiency  - Have poorly controlled HIV or AIDS  - Are obese (body mass index of 40 or higher)  - Smoke regularly    Caregivers should wear gloves while washing dishes, handling laundry and cleaning bedrooms and bathrooms.    Use caution when washing and drying laundry: Don t shake dirty laundry, and use the warmest water setting that you can.    For more tips, go to " www.cdc.gov/coronavirus/2019-ncov/downloads/10Things.pdf.    How can I take care of myself?  1. Get lots of rest. Drink extra fluids (unless a doctor has told you not to).     2. Take Tylenol (acetaminophen) for fever or pain. If you have liver or kidney problems, ask your family doctor if it s okay to take Tylenol.     Adults can take either:     650 mg (two 325 mg pills) every 4 to 6 hours, or     1,000 mg (two 500 mg pills) every 8 hours as needed.     Note: Don t take more than 3,000 mg in one day.   Acetaminophen is found in many medicines (both prescribed and over-the-counter medicines). Read all labels to be sure you don t take too much.     For children, check the Tylenol bottle for the right dose. The dose is based on the child s age or weight.    3. If you have other health problems (like cancer, heart failure, an organ transplant or severe kidney disease): Call your specialty clinic if you don t feel better in the next 2 days.    4. Know when to call 911: Emergency warning signs include:    Trouble breathing or shortness of breath    Pain or pressure in the chest that doesn t go away    Feeling confused like you haven t felt before, or not being able to wake up    Bluish-colored lips or face    What are the symptoms of COVID-19?     The most common symptoms are cough, fever and trouble breathing.     Less common symptoms include body aches, chills, diarrhea (loose, watery poops), fatigue (feeling very tired), headache, runny nose, sore throat and loss of smell.    COVID-19 can cause severe coughing (bronchitis) and lung infection (pneumonia).    How does it spread?     The virus may spread when a person coughs or sneezes into the air. The virus can travel about 6 feet this way, and it can live on surfaces.      Common  (household disinfectants) will kill the virus.    Who is at risk?  Anyone can catch COVID-19 if they re around someone who has the virus.    How can others protect themselves?      Stay away from people who have COVID-19 (or symptoms of COVID-19).    Wash hands often with soap and water. Or, use hand  with at least 60% alcohol.    Avoid touching the eyes, nose or mouth.     Wear a face mask when you go out in public, when sick or when caring for a sick person.    Where can I get more information?    [facility]: About COVID-19: [email or web address]    CDC: What to Do If You re Sick: www.cdc.gov/coronavirus/2019-ncov/about/steps-when-sick.html    CDC: Ending Home Isolation: www.cdc.gov/coronavirus/2019-ncov/hcp/disposition-in-home-patients.html     CDC: Caring for Someone: www.cdc.gov/coronavirus/2019-ncov/if-you-are-sick/care-for-someone.html     [facility]: Interim Guidance for Hospital Discharge to Home: www.health.[facility].[place]./diseases/coronavirus/hcp/hospdischarge.pdf    [facility] clinical trials (COVID-19 research studies): clinicalaffairs.[facility]-clinical-trials     Below are the COVID-19 hotlines at the [place] Department of Health ([facility]). Interpreters are available.   o For health questions: Call 085-978-4230 or 1-594.745.3871 (7 a.m. to 7 p.m.)  o For questions about schools and childcare: Call 665-579-8961 or 1-452.798.4322 (7 a.m. to 7 p.m.)          Thank you for taking steps to prevent the spread of this virus.  o Limit your contact with others.  o Wear a simple mask to cover your cough.  o Wash your hands well and often.    Resources    [facility]: About COVID-19: [email or web address]    CDC: What to Do If You're Sick: www.cdc.gov/coronavirus/2019-ncov/about/steps-when-sick.html    CDC: Ending Home Isolation: www.cdc.gov/coronavirus/2019-ncov/hcp/disposition-in-home-patients.html     CDC: Caring for Someone: www.cdc.gov/coronavirus/2019-ncov/if-you-are-sick/care-for-someone.html     [facility]: Interim Guidance for Hospital Discharge to Home: www.[facility].[place]/diseases/coronavirus/hcp/hospdischarge.pdf    [facility]  [place] clinical trials (COVID-19 research studies): clinicalaffairs.[facility]-clinical-trials     Below are the COVID-19 hotlines at the [facility] ([facility]). Interpreters are available.   o For health questions: Call 147-362-2575 or 1-196.373.1201 (7 a.m. to 7 p.m.)  o For questions about schools and childcare: Call 543-482-8101 or 1-299.520.9000 (7 a.m. to 7 p.m.)

## 2022-03-10 NOTE — TELEPHONE ENCOUNTER
"Routing refill request to provider for review/approval because:  Serotonin Agonist needs provider review.    Last Written Prescription Date:  3/10/2021  Last Fill Quantity: 30,  # refills: 3   Last office visit provider:  9/30/2021     Requested Prescriptions   Pending Prescriptions Disp Refills     SUMAtriptan (IMITREX) 50 MG tablet 30 tablet 3     Sig: Take 1 tablet (50 mg) by mouth every 2 hours as needed       Serotonin Agonists Failed - 3/10/2022  1:51 PM        Failed - Serotonin Agonist request needs review.     Please review patient's record. If patient has had 8 or more treatments in the past month, please forward to provider.          Passed - Blood pressure under 140/90 in past 12 months     BP Readings from Last 3 Encounters:   09/30/21 122/68   02/23/21 120/72   08/28/20 130/78                 Passed - Recent (12 mo) or future (30 days) visit within the authorizing provider's specialty     Patient has had an office visit with the authorizing provider or a provider within the authorizing providers department within the previous 12 mos or has a future within next 30 days. See \"Patient Info\" tab in inbasket, or \"Choose Columns\" in Meds & Orders section of the refill encounter.              Passed - Medication is active on med list        Passed - Patient is age 18 or older        Passed - No active pregnancy on record        Passed - No positive pregnancy test in past 12 months             Jessica Odonnell RN 03/10/22 1:51 PM  "

## 2022-03-10 NOTE — TELEPHONE ENCOUNTER
Medication: Imitrex 50mg  Last Date Filled: 3/10/21 #30 RF 3  Last appointment addressing medication: 9/30/21  Last B/P:  BP Readings from Last 3 Encounters:   09/30/21 122/68   02/23/21 120/72   08/28/20 130/78     Last labs pertaining to refill:

## 2022-05-18 DIAGNOSIS — F41.1 GENERALIZED ANXIETY DISORDER: ICD-10-CM

## 2022-05-19 RX ORDER — ALPRAZOLAM 0.5 MG
TABLET ORAL
Qty: 120 TABLET | Refills: 5 | Status: SHIPPED | OUTPATIENT
Start: 2022-05-19 | End: 2022-06-06

## 2022-05-19 NOTE — TELEPHONE ENCOUNTER
Routing refill request to provider for review/approval because:  Controlled substance request    Last Written Prescription Date:  11/2/21  Last Fill Quantity: 120,  # refills: 5   Last office visit provider:  9/30/21     Requested Prescriptions   Pending Prescriptions Disp Refills     ALPRAZolam (XANAX) 0.5 MG tablet [Pharmacy Med Name: ALPRAZOLAM 0.5 MG TAB 0.5 Tablet] 120 tablet 5     Sig: TAKE TWO TABLETS BY MOUTH AT BEDTIME, TWO DURING THE DAY AS NEEDED FOR ANXIETY.       There is no refill protocol information for this order          Roque Herrera RN 05/19/22 11:01 AM

## 2022-05-19 NOTE — TELEPHONE ENCOUNTER
Medication: alprazolam 0.5 MG tablet  Last Date Filled 11/2/21  Last appointment addressing medication use: 9/30/21      Taken as prescribed from physician notes? YES    CSA in last year: YES    Random Utox in last year: NO  (if any of the above answer NO - schedule with PCP)     Opioids + benzodiazepines? NO  (if the above answer YES - schedule with PCP every 6 months)       All responses suggest: .

## 2022-05-19 NOTE — TELEPHONE ENCOUNTER
How Severe Is Your Skin Lesion?: mild Patient called back asking for prescription since she will be out tomorrow. I notified her that it has been sent to Dr. Flores and should be refilled shortly.     Petrona Zimmerman CMA.   Have Your Skin Lesions Been Treated?: not been treated Is This A New Presentation, Or A Follow-Up?: Skin Lesions

## 2022-06-06 ENCOUNTER — OFFICE VISIT (OUTPATIENT)
Dept: FAMILY MEDICINE | Facility: CLINIC | Age: 62
End: 2022-06-06
Payer: COMMERCIAL

## 2022-06-06 VITALS
TEMPERATURE: 98.6 F | OXYGEN SATURATION: 98 % | HEART RATE: 67 BPM | DIASTOLIC BLOOD PRESSURE: 78 MMHG | BODY MASS INDEX: 29.02 KG/M2 | WEIGHT: 170 LBS | HEIGHT: 64 IN | RESPIRATION RATE: 16 BRPM | SYSTOLIC BLOOD PRESSURE: 128 MMHG

## 2022-06-06 DIAGNOSIS — R21 RASH AND NONSPECIFIC SKIN ERUPTION: ICD-10-CM

## 2022-06-06 DIAGNOSIS — Z51.81 MEDICATION MONITORING ENCOUNTER: ICD-10-CM

## 2022-06-06 DIAGNOSIS — F41.1 GENERALIZED ANXIETY DISORDER: ICD-10-CM

## 2022-06-06 DIAGNOSIS — E78.2 MIXED HYPERLIPIDEMIA: ICD-10-CM

## 2022-06-06 DIAGNOSIS — F32.1 MODERATE MAJOR DEPRESSION (H): ICD-10-CM

## 2022-06-06 DIAGNOSIS — G43.909 MIGRAINE WITHOUT STATUS MIGRAINOSUS, NOT INTRACTABLE, UNSPECIFIED MIGRAINE TYPE: Primary | ICD-10-CM

## 2022-06-06 LAB
ALBUMIN SERPL-MCNC: 4 G/DL (ref 3.5–5)
ALP SERPL-CCNC: 67 U/L (ref 45–120)
ALT SERPL W P-5'-P-CCNC: 11 U/L (ref 0–45)
ANION GAP SERPL CALCULATED.3IONS-SCNC: 11 MMOL/L (ref 5–18)
AST SERPL W P-5'-P-CCNC: 15 U/L (ref 0–40)
BILIRUB SERPL-MCNC: 0.5 MG/DL (ref 0–1)
BUN SERPL-MCNC: 13 MG/DL (ref 8–22)
CALCIUM SERPL-MCNC: 9 MG/DL (ref 8.5–10.5)
CHLORIDE BLD-SCNC: 106 MMOL/L (ref 98–107)
CHOLEST SERPL-MCNC: 241 MG/DL
CO2 SERPL-SCNC: 25 MMOL/L (ref 22–31)
CREAT SERPL-MCNC: 0.75 MG/DL (ref 0.6–1.1)
ERYTHROCYTE [DISTWIDTH] IN BLOOD BY AUTOMATED COUNT: 12.3 % (ref 10–15)
FASTING STATUS PATIENT QL REPORTED: ABNORMAL
GFR SERPL CREATININE-BSD FRML MDRD: 90 ML/MIN/1.73M2
GLUCOSE BLD-MCNC: 88 MG/DL (ref 70–125)
HCT VFR BLD AUTO: 38.2 % (ref 35–47)
HDLC SERPL-MCNC: 47 MG/DL
HGB BLD-MCNC: 12.5 G/DL (ref 11.7–15.7)
LDLC SERPL CALC-MCNC: 172 MG/DL
MCH RBC QN AUTO: 28.9 PG (ref 26.5–33)
MCHC RBC AUTO-ENTMCNC: 32.7 G/DL (ref 31.5–36.5)
MCV RBC AUTO: 88 FL (ref 78–100)
PLATELET # BLD AUTO: 225 10E3/UL (ref 150–450)
POTASSIUM BLD-SCNC: 4 MMOL/L (ref 3.5–5)
PROT SERPL-MCNC: 6.9 G/DL (ref 6–8)
RBC # BLD AUTO: 4.33 10E6/UL (ref 3.8–5.2)
SODIUM SERPL-SCNC: 142 MMOL/L (ref 136–145)
TRIGL SERPL-MCNC: 108 MG/DL
WBC # BLD AUTO: 4.9 10E3/UL (ref 4–11)

## 2022-06-06 PROCEDURE — 80053 COMPREHEN METABOLIC PANEL: CPT | Performed by: FAMILY MEDICINE

## 2022-06-06 PROCEDURE — 99214 OFFICE O/P EST MOD 30 MIN: CPT | Mod: 25 | Performed by: FAMILY MEDICINE

## 2022-06-06 PROCEDURE — 91305 COVID-19,PF,PFIZER (12+ YRS): CPT | Performed by: FAMILY MEDICINE

## 2022-06-06 PROCEDURE — 36415 COLL VENOUS BLD VENIPUNCTURE: CPT | Performed by: FAMILY MEDICINE

## 2022-06-06 PROCEDURE — 0054A COVID-19,PF,PFIZER (12+ YRS): CPT | Performed by: FAMILY MEDICINE

## 2022-06-06 PROCEDURE — 85027 COMPLETE CBC AUTOMATED: CPT | Performed by: FAMILY MEDICINE

## 2022-06-06 PROCEDURE — 80061 LIPID PANEL: CPT | Performed by: FAMILY MEDICINE

## 2022-06-06 PROCEDURE — 96127 BRIEF EMOTIONAL/BEHAV ASSMT: CPT | Performed by: FAMILY MEDICINE

## 2022-06-06 RX ORDER — SERTRALINE HYDROCHLORIDE 100 MG/1
200 TABLET, FILM COATED ORAL DAILY
Qty: 270 TABLET | Refills: 3 | Status: SHIPPED | OUTPATIENT
Start: 2022-06-06

## 2022-06-06 RX ORDER — ATORVASTATIN CALCIUM 20 MG/1
20 TABLET, FILM COATED ORAL AT BEDTIME
Qty: 90 TABLET | Refills: 3 | Status: SHIPPED | OUTPATIENT
Start: 2022-06-06

## 2022-06-06 RX ORDER — TRIAMCINOLONE ACETONIDE 1 MG/G
OINTMENT TOPICAL 2 TIMES DAILY
Qty: 30 G | Refills: 1 | Status: SHIPPED | OUTPATIENT
Start: 2022-06-06

## 2022-06-06 RX ORDER — SUMATRIPTAN 50 MG/1
50 TABLET, FILM COATED ORAL
Qty: 30 TABLET | Refills: 4 | Status: SHIPPED | OUTPATIENT
Start: 2022-06-06

## 2022-06-06 RX ORDER — ALPRAZOLAM 0.5 MG
TABLET ORAL
Qty: 120 TABLET | Refills: 5 | Status: SHIPPED | OUTPATIENT
Start: 2022-06-06

## 2022-06-06 ASSESSMENT — PATIENT HEALTH QUESTIONNAIRE - PHQ9
SUM OF ALL RESPONSES TO PHQ QUESTIONS 1-9: 11
10. IF YOU CHECKED OFF ANY PROBLEMS, HOW DIFFICULT HAVE THESE PROBLEMS MADE IT FOR YOU TO DO YOUR WORK, TAKE CARE OF THINGS AT HOME, OR GET ALONG WITH OTHER PEOPLE: NOT DIFFICULT AT ALL
SUM OF ALL RESPONSES TO PHQ QUESTIONS 1-9: 11

## 2022-06-06 ASSESSMENT — PAIN SCALES - GENERAL: PAINLEVEL: MODERATE PAIN (5)

## 2022-06-06 NOTE — LETTER
June 22, 2022      Yaritza PEREZ  [address]   [facility] [address]        Dear Ms.QUAST PEREZ,    We are writing to inform you of your test results.    Lipids are very high. If you are taking the lipitor faithfully at 20 mg daily, we should increase the dose further. Let me know if you are willing to do that. Normal electrolytes, CBC, liver and kidney functions. Recommend continue your same medications/supplements, and working on high fiber, low saturated fat diet and regular exercise. Recheck labs in 4-6 mos.        Resulted Orders   Lipid panel reflex to direct LDL Fasting   Result Value Ref Range    Cholesterol 241 (H) <=199 mg/dL    Triglycerides 108 <=149 mg/dL    Direct Measure HDL 47 (L) >=50 mg/dL      Comment:      HDL Cholesterol Reference Range:     0-2 years:   No reference ranges established for patients under 2 years old  at [facility] for lipid analytes.    2-8 years:  Greater than 45 mg/dL     18 years and older:   Female: Greater than or equal to 50 mg/dL   Male:   Greater than or equal to 40 mg/dL    LDL Cholesterol Calculated 172 (H) <=129 mg/dL    Patient Fasting > 8hrs? Unknown    Comprehensive metabolic panel (BMP + Alb, Alk Phos, ALT, AST, Total. Bili, TP)   Result Value Ref Range    Sodium 142 136 - 145 mmol/L    Potassium 4.0 3.5 - 5.0 mmol/L    Chloride 106 98 - 107 mmol/L    Carbon Dioxide (CO2) 25 22 - 31 mmol/L    Anion Gap 11 5 - 18 mmol/L    Urea Nitrogen 13 8 - 22 mg/dL    Creatinine 0.75 0.60 - 1.10 mg/dL    Calcium 9.0 8.5 - 10.5 mg/dL    Glucose 88 70 - 125 mg/dL    Alkaline Phosphatase 67 45 - 120 U/L    AST 15 0 - 40 U/L    ALT 11 0 - 45 U/L    Protein Total 6.9 6.0 - 8.0 g/dL    Albumin 4.0 3.5 - 5.0 g/dL    Bilirubin Total 0.5 0.0 - 1.0 mg/dL    GFR Estimate 90 >60 mL/min/1.73m2      Comment:      Effective December 21, 2021 eGFRcr in adults is calculated using the 2021 CKD-EPI creatinine equation which includes age and gender (Jolie et al., NEJM,  DOI: 10.1056/QKTYfy1503339)   CBC with platelets   Result Value Ref Range    WBC Count 4.9 4.0 - 11.0 10e3/uL    RBC Count 4.33 3.80 - 5.20 10e6/uL    Hemoglobin 12.5 11.7 - 15.7 g/dL    Hematocrit 38.2 35.0 - 47.0 %    MCV 88 78 - 100 fL    MCH 28.9 26.5 - 33.0 pg    MCHC 32.7 31.5 - 36.5 g/dL    RDW 12.3 10.0 - 15.0 %    Platelet Count 225 150 - 450 10e3/uL       If you have any questions or concerns, please call the clinic at the number listed above.       Sincerely,      Deirdre Flores MD

## 2022-06-06 NOTE — PROGRESS NOTES
Assessment & Plan:       ICD-10-CM    1. Migraine without status migrainosus, not intractable, unspecified migraine type  G43.909 SUMAtriptan (IMITREX) 50 MG tablet   2. Generalized anxiety disorder  F41.1 ALPRAZolam (XANAX) 0.5 MG tablet     sertraline (ZOLOFT) 100 MG tablet   3. Moderate major depression (H)  F32.1 sertraline (ZOLOFT) 100 MG tablet   4. Mixed hyperlipidemia  E78.2 atorvastatin (LIPITOR) 20 MG tablet     Lipid panel reflex to direct LDL Fasting     Comprehensive metabolic panel (BMP + Alb, Alk Phos, ALT, AST, Total. Bili, TP)   5. Rash and nonspecific skin eruption  R21 triamcinolone (KENALOG) 0.1 % external ointment   6. Medication monitoring encounter  Z51.81 CBC with platelets          Non-fasting labs were drawn. Blood pressure has been under good control. We reviewed her current medications and she will continue the same pending additional lab results. We reviewed dietary recommendations, including low salt and high fiber diet, and recommendations for regular exercise/activity. She will continue her same migraine regimen. She sees [place] for her history of ependymoma. We reviewed the etiology and natural history for depression/anxiety and options for treatment. We elected to continue sertraline and alprazolam, and we discussed indications for dose adjustment. We reviewed the potential side effects, need to taper the medications gradually, and indications for urgent evaluation. She will let me know if she has any significant problems or concerns. She should f/u in 4-6 mos, and [place]        Subjective:         Yaritza Mccarthy is a 62 year old female who presents for follow up of depression and anxiety. She has the following symptoms: decreased appetite, insomnia, difficulty with concentration. Onset of symptoms was several years  ago. Symptoms have been waxing and waning since that time. Current symptoms also include depressed mood, diminished  interest or pleasure in activities, feelings of guilt and feeling on edge/nervous/anxious, difficulty controlling worry, trouble relaxing and becoming easily annoyed or irritable. Patient denies hopelessness, feelings of worthlessness. She denies current suicidal and homicidal ideation.        Current treatment for depression: individual therapy and medication(s) Zoloft (sertraline) and alprazolam. She complains of the following side effects from the treatment: none.       She reports that she had COVID a few wks ago. She developed a blistery rash on her chest which is still very itchy and sensitive. She denies any numbness/tingling. She also had fatigue, ST, and no taste for 1-2 days. Those symptoms have resolved.         PATIENT HEALTH QUESTIONNAIRE-9 (PHQ - 9)    Over the last 2 weeks, how often have you been bothered by any of the following problems?    1. Little interest or pleasure in doing things -  Several days   2. Feeling down, depressed, or hopeless -  Several days   3. Trouble falling or staying asleep, or sleeping too much - Several days   4. Feeling tired or having little energy -  Nearly every day   5. Poor appetite or overeating -  Nearly every day   6. Feeling bad about yourself - or that you are a failure or have let yourself or your family down -  Several days   7. Trouble concentrating on things, such as reading the newspaper or watching television - Several days   8. Moving or speaking so slowly that other people could have noticed? Or the opposite - being so fidgety or restless that you have been moving around a lot more than usual Not at all   9. Thoughts that you would be better off dead or of hurting  yourself in some way Not at all   Total Score: 11     If you checked off any problems, how difficult have these problems made it for you to do your work, take care of things at home, or get along with other people?      Developed by Jose Verduzco, Grace Toussaint, Hitesh Johnson and  "colleagues, with an educational krystal from [employer] No permission required to reproduce, translate, display or distribute. permission required to reproduce, translate, display or distribute.    The following portions of the patient's history were reviewed and updated as appropriate: allergies, current medications, past family history, past medical history, past social history, past surgical history and problem list.     Review of Systems  12 point ROS negative except as noted above       Objective:      /78   Pulse 67   Temp 98.6  F (37  C)   Resp 16   Ht 1.626 m (5' 4\")   Wt 77.1 kg (170 lb)   SpO2 98%   Breastfeeding No   BMI 29.18 kg/m    GEN: Alert and oriented, NAD, well nourished  SKIN:  Normal skin turgor, no lesions/rashes   HEENT: NC/AT, moist mucous membranes.    NECK: Normal.    CV: Regular rate and rhythm.   LUNGS: Clear. Normal respirations.   EXTREMITY: No edema, cyanosis  NEURO: Grossly normal.         Mental Status Examination  Dress, grooming, personal hygiene: normal  Speech: normal  Mood: normal  Judgment: normal   "

## 2022-06-06 NOTE — PATIENT INSTRUCTIONS
Sometimes taking a magnesium supplement (250-500 mg) and/or CoQ 10 (100-200 mg) will help alleviate the muscle symptoms.

## 2023-11-14 NOTE — TELEPHONE ENCOUNTER
Reason for Call:  Medication or medication refill:    Do you use a [facility]?  Name of the pharmacy and phone number for the current request: [facility]    Name of the medication requested: CIPROFLOXIN, PYRIDIUM, AND LIPITOR    Other request: Pt is in [place], has UTI symptoms and so need 2 medications for that.  Also states she has started taking her Lipitor again and needs refill of that as well.    Can we leave a detailed message on this number? Yes    Phone number patient can be reached at: Home number on file [phone] (home)    Best Time: anytime    Call taken on 4/27/2021 at 8:37 AM by Arjun Waller  
Would you like this pt do do a telephone visit with Daisy VASQUEZ NP?   
Yes